# Patient Record
Sex: FEMALE | Race: OTHER | HISPANIC OR LATINO | URBAN - METROPOLITAN AREA
[De-identification: names, ages, dates, MRNs, and addresses within clinical notes are randomized per-mention and may not be internally consistent; named-entity substitution may affect disease eponyms.]

---

## 2017-05-23 ENCOUNTER — EMERGENCY (EMERGENCY)
Facility: HOSPITAL | Age: 52
LOS: 1 days | Discharge: HOME | End: 2017-05-23
Admitting: EMERGENCY MEDICINE
Payer: MEDICAID

## 2017-05-23 ENCOUNTER — APPOINTMENT (OUTPATIENT)
Dept: ORTHOPEDIC SURGERY | Facility: CLINIC | Age: 52
End: 2017-05-23

## 2017-05-23 VITALS
RESPIRATION RATE: 16 BRPM | HEART RATE: 92 BPM | DIASTOLIC BLOOD PRESSURE: 81 MMHG | SYSTOLIC BLOOD PRESSURE: 136 MMHG | TEMPERATURE: 98 F | OXYGEN SATURATION: 100 %

## 2017-05-23 PROCEDURE — 99053 MED SERV 10PM-8AM 24 HR FAC: CPT

## 2017-05-23 PROCEDURE — 99284 EMERGENCY DEPT VISIT MOD MDM: CPT | Mod: 25

## 2017-05-23 PROCEDURE — 73590 X-RAY EXAM OF LOWER LEG: CPT | Mod: 26,LT

## 2017-05-23 PROCEDURE — 73610 X-RAY EXAM OF ANKLE: CPT | Mod: 26,LT

## 2017-05-23 RX ORDER — KETOROLAC TROMETHAMINE 30 MG/ML
30 SYRINGE (ML) INJECTION ONCE
Qty: 0 | Refills: 0 | Status: DISCONTINUED | OUTPATIENT
Start: 2017-05-23 | End: 2017-05-23

## 2017-05-23 RX ORDER — IBUPROFEN 200 MG
1 TABLET ORAL
Qty: 15 | Refills: 0 | OUTPATIENT
Start: 2017-05-23 | End: 2017-05-28

## 2017-05-23 RX ADMIN — Medication 30 MILLIGRAM(S): at 03:27

## 2017-05-23 NOTE — ED PROVIDER NOTE - OBJECTIVE STATEMENT
52 y/o female no pmh c/o left ankle pain x2 days. Pt admits to being in [place] and having mechanical fall x2 days ago. pt went to doctor in [place] and dx with ankle fracture. Pt was placed in posterior splint and told to f/u. Pt presents today for eval of ankle and to make sure fracture has not become worse. pt admits to pain and swelling to left ankle. Denies numbness, tingling, weakness, redness, fever or chills.

## 2017-05-24 ENCOUNTER — APPOINTMENT (OUTPATIENT)
Dept: ORTHOPEDIC SURGERY | Facility: CLINIC | Age: 52
End: 2017-05-24

## 2017-05-24 VITALS
SYSTOLIC BLOOD PRESSURE: 108 MMHG | BODY MASS INDEX: 27.49 KG/M2 | HEART RATE: 85 BPM | WEIGHT: 165 LBS | DIASTOLIC BLOOD PRESSURE: 70 MMHG | HEIGHT: 65 IN

## 2017-05-24 DIAGNOSIS — M21.6X2 OTHER ACQUIRED DEFORMITIES OF LEFT FOOT: ICD-10-CM

## 2017-05-26 ENCOUNTER — MEDICATION RENEWAL (OUTPATIENT)
Age: 52
End: 2017-05-26

## 2017-05-27 PROBLEM — M21.6X2 GASTROCNEMIUS EQUINUS OF LEFT LOWER EXTREMITY: Status: ACTIVE | Noted: 2017-05-27

## 2017-06-07 ENCOUNTER — APPOINTMENT (OUTPATIENT)
Dept: ORTHOPEDIC SURGERY | Facility: CLINIC | Age: 52
End: 2017-06-07

## 2017-06-21 ENCOUNTER — APPOINTMENT (OUTPATIENT)
Dept: ORTHOPEDIC SURGERY | Facility: CLINIC | Age: 52
End: 2017-06-21

## 2017-07-06 ENCOUNTER — APPOINTMENT (OUTPATIENT)
Dept: ORTHOPEDIC SURGERY | Facility: CLINIC | Age: 52
End: 2017-07-06

## 2017-07-19 ENCOUNTER — APPOINTMENT (OUTPATIENT)
Dept: MAMMOGRAPHY | Facility: IMAGING CENTER | Age: 52
End: 2017-07-19

## 2017-07-19 ENCOUNTER — APPOINTMENT (OUTPATIENT)
Dept: RADIOLOGY | Facility: IMAGING CENTER | Age: 52
End: 2017-07-19

## 2017-07-19 ENCOUNTER — OUTPATIENT (OUTPATIENT)
Dept: OUTPATIENT SERVICES | Facility: HOSPITAL | Age: 52
LOS: 1 days | End: 2017-07-19
Payer: MEDICAID

## 2017-07-19 DIAGNOSIS — Z00.8 ENCOUNTER FOR OTHER GENERAL EXAMINATION: ICD-10-CM

## 2017-07-19 PROCEDURE — 77067 SCR MAMMO BI INCL CAD: CPT

## 2017-07-19 PROCEDURE — 77063 BREAST TOMOSYNTHESIS BI: CPT

## 2017-07-19 PROCEDURE — 71046 X-RAY EXAM CHEST 2 VIEWS: CPT

## 2017-07-25 ENCOUNTER — APPOINTMENT (OUTPATIENT)
Dept: ORTHOPEDIC SURGERY | Facility: CLINIC | Age: 52
End: 2017-07-25

## 2017-07-25 VITALS — BODY MASS INDEX: 27.49 KG/M2 | HEIGHT: 65 IN | WEIGHT: 165 LBS

## 2017-07-25 DIAGNOSIS — S82.892D OTHER FRACTURE OF LEFT LOWER LEG, SUBSEQUENT ENCOUNTER FOR CLOSED FRACTURE WITH ROUTINE HEALING: ICD-10-CM

## 2017-07-25 DIAGNOSIS — F17.200 NICOTINE DEPENDENCE, UNSPECIFIED, UNCOMPLICATED: ICD-10-CM

## 2017-07-25 DIAGNOSIS — Z12.31 ENCOUNTER FOR SCREENING MAMMOGRAM FOR MALIGNANT NEOPLASM OF BREAST: ICD-10-CM

## 2017-07-25 DIAGNOSIS — R91.8 OTHER NONSPECIFIC ABNORMAL FINDING OF LUNG FIELD: ICD-10-CM

## 2017-07-27 ENCOUNTER — APPOINTMENT (OUTPATIENT)
Dept: CT IMAGING | Facility: IMAGING CENTER | Age: 52
End: 2017-07-27
Payer: MEDICAID

## 2017-07-27 ENCOUNTER — OUTPATIENT (OUTPATIENT)
Dept: OUTPATIENT SERVICES | Facility: HOSPITAL | Age: 52
LOS: 1 days | End: 2017-07-27
Payer: MEDICAID

## 2017-07-27 DIAGNOSIS — Z00.8 ENCOUNTER FOR OTHER GENERAL EXAMINATION: ICD-10-CM

## 2017-07-27 PROCEDURE — 82565 ASSAY OF CREATININE: CPT

## 2017-07-27 PROCEDURE — 71260 CT THORAX DX C+: CPT | Mod: 26

## 2017-07-27 PROCEDURE — 71260 CT THORAX DX C+: CPT

## 2017-08-04 ENCOUNTER — EMERGENCY (EMERGENCY)
Facility: HOSPITAL | Age: 52
LOS: 1 days | Discharge: HOME | End: 2017-08-04
Attending: EMERGENCY MEDICINE | Admitting: EMERGENCY MEDICINE
Payer: MEDICAID

## 2017-08-04 VITALS
RESPIRATION RATE: 16 BRPM | DIASTOLIC BLOOD PRESSURE: 86 MMHG | TEMPERATURE: 99 F | SYSTOLIC BLOOD PRESSURE: 126 MMHG | HEART RATE: 72 BPM | OXYGEN SATURATION: 99 %

## 2017-08-04 PROCEDURE — 99285 EMERGENCY DEPT VISIT HI MDM: CPT | Mod: 25

## 2017-08-04 PROCEDURE — 93010 ELECTROCARDIOGRAM REPORT: CPT

## 2017-08-05 VITALS
RESPIRATION RATE: 15 BRPM | DIASTOLIC BLOOD PRESSURE: 82 MMHG | SYSTOLIC BLOOD PRESSURE: 128 MMHG | OXYGEN SATURATION: 100 % | HEART RATE: 54 BPM

## 2017-08-05 LAB
ALBUMIN SERPL ELPH-MCNC: 4 G/DL — SIGNIFICANT CHANGE UP (ref 3.3–5)
ALP SERPL-CCNC: 88 U/L — SIGNIFICANT CHANGE UP (ref 40–120)
ALT FLD-CCNC: 20 U/L — SIGNIFICANT CHANGE UP (ref 4–33)
AST SERPL-CCNC: 31 U/L — SIGNIFICANT CHANGE UP (ref 4–32)
BASOPHILS # BLD AUTO: 0.08 K/UL — SIGNIFICANT CHANGE UP (ref 0–0.2)
BASOPHILS NFR BLD AUTO: 0.7 % — SIGNIFICANT CHANGE UP (ref 0–2)
BASOPHILS NFR SPEC: 2 % — SIGNIFICANT CHANGE UP (ref 0–2)
BILIRUB SERPL-MCNC: 0.2 MG/DL — SIGNIFICANT CHANGE UP (ref 0.2–1.2)
BUN SERPL-MCNC: 22 MG/DL — SIGNIFICANT CHANGE UP (ref 7–23)
CALCIUM SERPL-MCNC: 10 MG/DL — SIGNIFICANT CHANGE UP (ref 8.4–10.5)
CHLORIDE SERPL-SCNC: 104 MMOL/L — SIGNIFICANT CHANGE UP (ref 98–107)
CK MB BLD-MCNC: 2.34 NG/ML — SIGNIFICANT CHANGE UP (ref 1–4.7)
CK SERPL-CCNC: 276 U/L — HIGH (ref 25–170)
CO2 SERPL-SCNC: 20 MMOL/L — LOW (ref 22–31)
CREAT SERPL-MCNC: 0.77 MG/DL — SIGNIFICANT CHANGE UP (ref 0.5–1.3)
EOSINOPHIL # BLD AUTO: 0.42 K/UL — SIGNIFICANT CHANGE UP (ref 0–0.5)
EOSINOPHIL NFR BLD AUTO: 3.5 % — SIGNIFICANT CHANGE UP (ref 0–6)
EOSINOPHIL NFR FLD: 5 % — SIGNIFICANT CHANGE UP (ref 0–6)
GLUCOSE SERPL-MCNC: 92 MG/DL — SIGNIFICANT CHANGE UP (ref 70–99)
HCT VFR BLD CALC: 39.1 % — SIGNIFICANT CHANGE UP (ref 34.5–45)
HGB BLD-MCNC: 12.6 G/DL — SIGNIFICANT CHANGE UP (ref 11.5–15.5)
IMM GRANULOCYTES # BLD AUTO: 0.02 # — SIGNIFICANT CHANGE UP
IMM GRANULOCYTES NFR BLD AUTO: 0.2 % — SIGNIFICANT CHANGE UP (ref 0–1.5)
LYMPHOCYTES # BLD AUTO: 47 % — HIGH (ref 13–44)
LYMPHOCYTES # BLD AUTO: 5.58 K/UL — HIGH (ref 1–3.3)
LYMPHOCYTES NFR SPEC AUTO: 44 % — SIGNIFICANT CHANGE UP (ref 13–44)
MANUAL SMEAR VERIFICATION: SIGNIFICANT CHANGE UP
MCHC RBC-ENTMCNC: 30.7 PG — SIGNIFICANT CHANGE UP (ref 27–34)
MCHC RBC-ENTMCNC: 32.2 % — SIGNIFICANT CHANGE UP (ref 32–36)
MCV RBC AUTO: 95.1 FL — SIGNIFICANT CHANGE UP (ref 80–100)
MONOCYTES # BLD AUTO: 0.84 K/UL — SIGNIFICANT CHANGE UP (ref 0–0.9)
MONOCYTES NFR BLD AUTO: 7.1 % — SIGNIFICANT CHANGE UP (ref 2–14)
MONOCYTES NFR BLD: 3 % — SIGNIFICANT CHANGE UP (ref 2–9)
MORPHOLOGY BLD-IMP: NORMAL — SIGNIFICANT CHANGE UP
NEUTROPHIL AB SER-ACNC: 46 % — SIGNIFICANT CHANGE UP (ref 43–77)
NEUTROPHILS # BLD AUTO: 4.94 K/UL — SIGNIFICANT CHANGE UP (ref 1.8–7.4)
NEUTROPHILS NFR BLD AUTO: 41.5 % — LOW (ref 43–77)
NRBC # FLD: 0 — SIGNIFICANT CHANGE UP
PLATELET # BLD AUTO: 344 K/UL — SIGNIFICANT CHANGE UP (ref 150–400)
PLATELET COUNT - ESTIMATE: NORMAL — SIGNIFICANT CHANGE UP
PMV BLD: 10 FL — SIGNIFICANT CHANGE UP (ref 7–13)
POTASSIUM SERPL-MCNC: 5.5 MMOL/L — HIGH (ref 3.5–5.3)
POTASSIUM SERPL-SCNC: 5.5 MMOL/L — HIGH (ref 3.5–5.3)
PROT SERPL-MCNC: 7.4 G/DL — SIGNIFICANT CHANGE UP (ref 6–8.3)
RBC # BLD: 4.11 M/UL — SIGNIFICANT CHANGE UP (ref 3.8–5.2)
RBC # FLD: 14 % — SIGNIFICANT CHANGE UP (ref 10.3–14.5)
SODIUM SERPL-SCNC: 139 MMOL/L — SIGNIFICANT CHANGE UP (ref 135–145)
TROPONIN T SERPL-MCNC: < 0.06 NG/ML — SIGNIFICANT CHANGE UP (ref 0–0.06)
WBC # BLD: 11.88 K/UL — HIGH (ref 3.8–10.5)
WBC # FLD AUTO: 11.88 K/UL — HIGH (ref 3.8–10.5)

## 2017-08-05 RX ORDER — METOCLOPRAMIDE HCL 10 MG
10 TABLET ORAL ONCE
Qty: 0 | Refills: 0 | Status: COMPLETED | OUTPATIENT
Start: 2017-08-05 | End: 2017-08-05

## 2017-08-05 RX ORDER — ACETAMINOPHEN 500 MG
650 TABLET ORAL ONCE
Qty: 0 | Refills: 0 | Status: DISCONTINUED | OUTPATIENT
Start: 2017-08-05 | End: 2017-08-08

## 2017-08-05 RX ORDER — SODIUM CHLORIDE 9 MG/ML
1000 INJECTION INTRAMUSCULAR; INTRAVENOUS; SUBCUTANEOUS ONCE
Qty: 0 | Refills: 0 | Status: COMPLETED | OUTPATIENT
Start: 2017-08-05 | End: 2017-08-05

## 2017-08-05 NOTE — ED PROVIDER NOTE - OBJECTIVE STATEMENT
51-year-old female with multiple complaints. Her biggest concern is headache she has had for 12 hours since waking up this morning. Describes as pressure-like, left-sided, associated w left eye pain, without nausea or vomiting. Has had headaches before but this feels different/worse than usual, no history migraines. Tried Aleve with little relief. Denies dizziness or blurry vision. Also complains of diffuse, bilateral neck pain she has had for 2 months that worsened today, as well as non-exertional left-sided chest pain without radiation/nausea and without exertional association. Has SOB at baseline (has been smoking 35+ years) but denies any worsening today. Complains of bilateral leg swelling as well; no recent travel, no pleurisy, no fevers or abdominal pain.

## 2017-08-05 NOTE — ED PROVIDER NOTE - PLAN OF CARE
You were seen in the emergency department for headache and chest pain. Your headache resolved after reglan, and was thought to be due to a migraine. Your chest pain resolved; you had a normal electrocardiogram and no concerning features. If your headache or chest pain worsens, you develop fevers or worsening shortness of breath, or you have vomiting or any other concerning symptoms, please return to the ED. Please follow up with your primary doctor in the next week. Take tylenol as needed for headache and any other pain.

## 2017-08-05 NOTE — ED PROVIDER NOTE - CARE PLAN
Principal Discharge DX:	Migraine without aura and without status migrainosus, not intractable  Instructions for follow-up, activity and diet:	You were seen in the emergency department for headache and chest pain. Your headache resolved after reglan, and was thought to be due to a migraine. Your chest pain resolved; you had a normal electrocardiogram and no concerning features. If your headache or chest pain worsens, you develop fevers or worsening shortness of breath, or you have vomiting or any other concerning symptoms, please return to the ED. Please follow up with your primary doctor in the next week. Take tylenol as needed for headache and any other pain.

## 2017-08-05 NOTE — ED ADULT NURSE NOTE - OBJECTIVE STATEMENT
Nany RN: Received pt in room 5, ambulatory, pt A&Ox4, respirations even and unlabored b/l. Sinus shanta on cardiac monitor. Swelling noted on ankles b/l. Labs sent. Awaiting MD santos. Report endorsed to primary RN Racquel.

## 2017-08-05 NOTE — ED ADULT NURSE REASSESSMENT NOTE - NS ED NURSE REASSESS COMMENT FT1
No acute distress. Pt. appears comfortable at present. Pt. is being discharged. Discharge teaching done by MD Goldberger.
Pt. refused Tylenol. Pt. states "I just want to be discharged and go home." MD Goldberger made aware.
Report received from break coverage RACHNA Borrego. Pt. states "I want to go home." MD aware.
pt. alert, awake, reports improvement in headache (now 6/10) since earlier. Attempted IV placement x 3; pt. pulled arm away, stating, "this hurts more than my headache, I don't want IV anymore, can you just give me a pill or something?". MD notified and aware. VS as noted, in NAD.

## 2017-08-05 NOTE — ED PROVIDER NOTE - ATTENDING CONTRIBUTION TO CARE
Case of a 50 y/o female patient with signs and symptoms of migraine, will give pain meds, order labs and monitor closely. Agree with resident's physical exam, assessment and documentation

## 2017-08-05 NOTE — ED PROVIDER NOTE - PROGRESS NOTE DETAILS
patient states that she is no longer any pain and would like to go home. Rejects pain medications offered.

## 2017-08-05 NOTE — ED PROVIDER NOTE - MEDICAL DECISION MAKING DETAILS
52yo female here for headache, neck pain, nonspecific chest pain. Normal EKG. Suspicion for PE low given absence of risk factors, no tachycardia or tachypnea. Headache relieved by reglan, no need for CT head at this time since seems like migraine clinically.

## 2017-08-05 NOTE — ED ADULT NURSE NOTE - CHIEF COMPLAINT
The patient is a 51y Female complaining of left sided h/a since morning, left-sided c/p since afternoon rad to neck. Pt c/o b/l LE swelling, reports healing left broken ankle x2 months, noticed right ankle swelling yesterday and swelling b/l knees.

## 2017-08-05 NOTE — ED ADULT NURSE NOTE - CHIEF COMPLAINT QUOTE
MRN:[id number]                      After Visit Summary   4/6/2017    Jayce Myers    MRN: [id number]           Thank you!     Thank you for choosing [facility] for your care. Our goal is always to provide you with excellent care. Hearing back from our patients is one way we can continue to improve our services. Please take a few minutes to complete the written survey that you may receive in the mail after you visit with us. Thank you!        Patient Information     Date Of Birth          1967        Designated Caregiver       Most Recent Value    Caregiver    Will someone help with your care after discharge? no      About your hospital stay     You were admitted on:  April 6, 2017 You last received care in the:  Unit 6D Observation [facility]    You were discharged on:  April 7, 2017        Reason for your hospital stay       Mr. Myers, you were evaluated for chest pain.  Your heart muscle blood work was negative. Your vital signs remained stable.  Your electrolytes, hemoglobin, white blood cell count were all within normal limits.  Your lipid (cholesterol) lab work was borderline high. Follow up with your primary care provider in 1 week.  You have been given Tramadol for your chest wall pain. Take as directed. Do not drive, drink alcohol or operate machinery while taking this medication. It can make you drowsy.  As discussed, modify your diet- increase fresh fruits and vegetables, avoid high fat, processed foods. Increase your water intake. Avoid caffeine. Increase your exercise regimen as tolerated.  Warm packs four times daily to affected area (15 minutes at a time) while awake.  Return to the emergency department for worsening chest pain, shortness of breath, worsening symptoms or concerns.                  Who to Call     For medical emergencies, please call 911.  For non-urgent questions about your medical care, please call your primary care provider or clinic, [phone]           Attending Provider     Provider Specialty    Kayla Diaz MD Emergency Medicine    Axel Camarena MD Emergency Medicine       Primary Care Provider Office Phone # Fax #    Chapo Maldonado [phone]       [facility] [address] [facility] [address]  [facility] [address]         When to contact your care team       Return to the emergency department for chest pain, shortness of breath, fever, worsening symptoms or concerns                  After Care Instructions     Activity       Your activity upon discharge: activity as tolerated            Diet       Follow this diet upon discharge: Orders Placed This Encounter      Low Saturated Fat Na <2400 mg                  Follow-up Appointments     Adult [facility] Follow-up and recommended labs and tests       Follow up with primary care provider, Chapo Maldonado, within 7 days for hospital follow- up.  Follow up on lipid panel.    Appointments on [place] and/or [facility] (with [facility] or [facility] provider or service). Call [phone] if you haven't heard regarding these appointments within 7 days of discharge.                  Your next 10 appointments already scheduled     Apr 17, 2017  3:30 PM CDT   Office Visit with Chapo Maldonado MD   [facility] ([facility])    [address] [place] [address] [address]  [facility] [id number] [phone]           Bring a current list of meds and any records pertaining to this visit.  For Physicals, please bring immunization records and any forms needing to be filled out.  Please arrive 10 minutes early to complete paperwork.              Pending Results     No orders found for last 3 day(s).            Statement of Approval     Ordered          04/07/17 1418  I have reviewed and agree with all the recommendations and orders detailed in this document.  EFFECTIVE NOW     Approved and electronically signed by:  Meka Perdomo APRN CNP         Pt c/o L sided CP & HA x 1d. Also c/o neck pain x 2 mo. Also c/o BLE swelling x 3d.      Admission Information     Date & Time Provider Department Dept. Phone    4/6/2017 Axel Camarena MD Unit 6D Observation [facility] [place] [phone]      Your Vitals Were     Blood Pressure Pulse Temperature Respirations Weight Pulse Oximetry    103/73 67 98.2  F (36.8  C) (Oral) 18 73.9 kg (163 lb) 98%    BMI (Body Mass Index)                   25.53 kg/m2           [employer] Information     [employer] gives you secure access to your electronic health record. If you see a primary care provider, you can also send messages to your care team and make appointments. If you have questions, please call your primary care clinic.  If you do not have a primary care provider, please call [phone] and they will assist you.        Care EveryWhere ID     This is your Care EveryWhere ID. This could be used by other organizations to access your [facility] medical records  [id number]           Review of your medicines      START taking        Dose / Directions    traMADol 50 MG tablet   Commonly known as:  ULTRAM   Used for:  Costochondral chest pain        Dose:  50 mg   Take 1 tablet (50 mg) by mouth every 6 hours as needed for breakthrough pain maximum 3 tablet(s) per day   Quantity:  12 tablet   Refills:  0         CONTINUE these medicines which have NOT CHANGED        Dose / Directions    IBUPROFEN PO        Dose:  400 mg   Take 400 mg by mouth every 4 hours as needed for moderate pain   Refills:  0       loratadine 10 MG tablet   Commonly known as:  CLARITIN        Dose:  10 mg   Take 10 mg by mouth daily   Refills:  0            Where to get your medicines      Some of these will need a paper prescription and others can be bought over the counter. Ask your nurse if you have questions.     Bring a paper prescription for each of these medications     traMADol 50 MG tablet                Protect others around you: Learn how to safely use, store and throw away your medicines at www.disposemymeds.org.              Medication List: This is a list of all your medications and when to take them. Check marks below indicate your daily home schedule. Keep this list as a reference.      Medications           Morning Afternoon Evening Bedtime As Needed    IBUPROFEN PO   Take 400 mg by mouth every 4 hours as needed for moderate pain   Last time this was given:  400 mg on 4/6/2017 10:04 PM                                loratadine 10 MG tablet   Commonly known as:  CLARITIN   Take 10 mg by mouth daily                                traMADol 50 MG tablet   Commonly known as:  ULTRAM   Take 1 tablet (50 mg) by mouth every 6 hours as needed for breakthrough pain maximum 3 tablet(s) per day                                          More Information         *CHEST PAIN, UNCERTAIN CAUSE    Based on your exam today, the exact cause of your chest pain is not certain. Your condition does not seem serious at this time, and your pain does not appear to be coming from your heart. However, sometimes the signs of a serious problem take more time to appear. Therefore, watch for the warning signs listed below.  HOME CARE:  1. Rest today and avoid strenuous activity.  2. Take any prescribed medicine as directed.  FOLLOW UP with your doctor in 1-3 days.   GET PROMPT MEDICAL ATTENTION if any of the following occur:    A change in the type of pain: if it feels different, becomes more severe, lasts longer, or begins to spread into your shoulder, arm, neck, jaw or back    Shortness of breath or increased pain with breathing    Weakness, dizziness, or fainting    Cough with blood or dark colored sputum (phlegm)    Fever over 101  F (38.3  C)    Swelling, pain or redness in one leg    [id number] [address], [place] [address]. All rights reserved. This information is not intended as a substitute for professional medical care. Always follow your healthcare professional's instructions.

## 2017-08-14 ENCOUNTER — OUTPATIENT (OUTPATIENT)
Dept: OUTPATIENT SERVICES | Facility: HOSPITAL | Age: 52
LOS: 1 days | End: 2017-08-14
Payer: MEDICAID

## 2017-08-14 ENCOUNTER — APPOINTMENT (OUTPATIENT)
Dept: MRI IMAGING | Facility: IMAGING CENTER | Age: 52
End: 2017-08-14
Payer: MEDICAID

## 2017-08-14 DIAGNOSIS — Z00.8 ENCOUNTER FOR OTHER GENERAL EXAMINATION: ICD-10-CM

## 2017-08-14 PROCEDURE — 74183 MRI ABD W/O CNTR FLWD CNTR: CPT

## 2017-08-14 PROCEDURE — 74183 MRI ABD W/O CNTR FLWD CNTR: CPT | Mod: 26

## 2017-08-14 PROCEDURE — A9585: CPT

## 2017-08-16 ENCOUNTER — OUTPATIENT (OUTPATIENT)
Dept: OUTPATIENT SERVICES | Facility: HOSPITAL | Age: 52
LOS: 1 days | End: 2017-08-16
Payer: MEDICAID

## 2017-08-16 ENCOUNTER — APPOINTMENT (OUTPATIENT)
Dept: RADIOLOGY | Facility: IMAGING CENTER | Age: 52
End: 2017-08-16
Payer: MEDICAID

## 2017-08-16 DIAGNOSIS — Z00.8 ENCOUNTER FOR OTHER GENERAL EXAMINATION: ICD-10-CM

## 2017-08-16 PROCEDURE — 72050 X-RAY EXAM NECK SPINE 4/5VWS: CPT

## 2017-08-16 PROCEDURE — 72050 X-RAY EXAM NECK SPINE 4/5VWS: CPT | Mod: 26

## 2017-08-29 ENCOUNTER — APPOINTMENT (OUTPATIENT)
Dept: ORTHOPEDIC SURGERY | Facility: CLINIC | Age: 52
End: 2017-08-29

## 2017-09-05 ENCOUNTER — APPOINTMENT (OUTPATIENT)
Dept: ORTHOPEDIC SURGERY | Facility: CLINIC | Age: 52
End: 2017-09-05

## 2017-10-16 ENCOUNTER — APPOINTMENT (OUTPATIENT)
Dept: OTOLARYNGOLOGY | Facility: CLINIC | Age: 52
End: 2017-10-16

## 2018-02-01 ENCOUNTER — OUTPATIENT (OUTPATIENT)
Dept: OUTPATIENT SERVICES | Facility: HOSPITAL | Age: 53
LOS: 1 days | End: 2018-02-01
Payer: MEDICAID

## 2018-02-01 PROCEDURE — G9001: CPT

## 2018-02-02 ENCOUNTER — LABORATORY RESULT (OUTPATIENT)
Age: 53
End: 2018-02-02

## 2018-02-02 ENCOUNTER — MOBILE ON CALL (OUTPATIENT)
Age: 53
End: 2018-02-02

## 2018-02-02 ENCOUNTER — APPOINTMENT (OUTPATIENT)
Dept: DERMATOLOGY | Facility: CLINIC | Age: 53
End: 2018-02-02
Payer: MEDICAID

## 2018-02-02 VITALS
SYSTOLIC BLOOD PRESSURE: 122 MMHG | HEIGHT: 65 IN | BODY MASS INDEX: 26.33 KG/M2 | WEIGHT: 158 LBS | DIASTOLIC BLOOD PRESSURE: 84 MMHG

## 2018-02-02 DIAGNOSIS — Z78.9 OTHER SPECIFIED HEALTH STATUS: ICD-10-CM

## 2018-02-02 DIAGNOSIS — R23.8 OTHER SKIN CHANGES: ICD-10-CM

## 2018-02-02 DIAGNOSIS — F17.200 NICOTINE DEPENDENCE, UNSPECIFIED, UNCOMPLICATED: ICD-10-CM

## 2018-02-02 PROCEDURE — 11100 BX SKIN SUBCUTANEOUS&/MUCOUS MEMBRANE 1 LESION: CPT | Mod: GC

## 2018-02-02 PROCEDURE — 99203 OFFICE O/P NEW LOW 30 MIN: CPT | Mod: 25,GC

## 2018-02-02 RX ORDER — NICOTINE TRANSDERMAL SYSTEM 21 MG/24H
21 PATCH, EXTENDED RELEASE TRANSDERMAL
Qty: 14 | Refills: 0 | Status: ACTIVE | COMMUNITY
Start: 2018-01-19

## 2018-02-06 ENCOUNTER — APPOINTMENT (OUTPATIENT)
Dept: OBGYN | Facility: CLINIC | Age: 53
End: 2018-02-06

## 2018-02-07 ENCOUNTER — RESULT REVIEW (OUTPATIENT)
Age: 53
End: 2018-02-07

## 2018-02-08 ENCOUNTER — EMERGENCY (EMERGENCY)
Facility: HOSPITAL | Age: 53
LOS: 1 days | Discharge: HOME | End: 2018-02-08
Attending: EMERGENCY MEDICINE | Admitting: EMERGENCY MEDICINE
Payer: MEDICAID

## 2018-02-08 VITALS
OXYGEN SATURATION: 100 % | HEART RATE: 99 BPM | SYSTOLIC BLOOD PRESSURE: 110 MMHG | RESPIRATION RATE: 18 BRPM | TEMPERATURE: 100 F | DIASTOLIC BLOOD PRESSURE: 68 MMHG

## 2018-02-08 PROCEDURE — 99283 EMERGENCY DEPT VISIT LOW MDM: CPT | Mod: 25

## 2018-02-08 NOTE — ED ADULT TRIAGE NOTE - CHIEF COMPLAINT QUOTE
Patient c/o abscess to right buttocks, left chin, and by left of bridge of nose. Patient reports having biopsy to abscess on face and was told it was "cancerous". Patient c/o "pink, thick" discharge from buttocks. Patient denies any fevers, c/o chills. Denies any other PMHx.

## 2018-02-08 NOTE — ED PROVIDER NOTE - PROGRESS NOTE DETAILS
Dr. Brandon Michaels PGY1: US showing cobblestoning in cellulitic area, very small fluid pocket 3cm below skin, will not D&C, will dc w/ keflex

## 2018-02-08 NOTE — ED PROVIDER NOTE - MEDICAL DECISION MAKING DETAILS
51 yo F w/ likely uncomplicated R buttock cellulitis, no constitutional sx, will US r/o abscess, I&D if present, otherwise likely dc w/ po ab

## 2018-02-08 NOTE — ED PROVIDER NOTE - ATTENDING CONTRIBUTION TO CARE
DR. RIVER, ATTENDING MD-  I performed a face to face bedside interview with patient regarding history of present illness, review of symptoms and past medical history. I completed an independent physical exam.  I have discussed patient's plan of care with the resident.   Documentation as above in the note.   HPI: 51 yo F with no Past Medical History that presents with left buttocks pain x 2 wks worsening, hard to sit, noticed small pimple 2 wks ago, she "pinched" the pimple and worsened, but had some small amount bleeding. Since has not taken meds for this. Saw Derm for other issues and had biopsy of nose and turned out to be cancer, has f/u next week. No systemic symptoms including Fevers, chills, N/V/D, dysuria, hematuria.   EXAM: Female Chaperone (Maddie NAVA), left buttocks with 10 x 10cm erythema, tenderness to palpation and induration, no palpable fluctuance, no crepitus, no signs nec fasc. No bleeding or drainage. Bedside US shows no subcu abscess seen.   MDM: Concern is for cellulitis. No pus pocket on US. Pt reports already had two episodes drainage trying to drain pimple at home. Will send home rec pain meds OTC, sitz bath, and Rx for PO Abx. f/u with PMD, Return precautions explained such as worsening symptoms/pain, drainage, fevers, chills, N/V/D. Rec to stop trying to drain abscess in future.

## 2018-02-08 NOTE — ED ADULT NURSE NOTE - OBJECTIVE STATEMENT
Nany RN-Pt received to rm 7 a&ox4 and ambulatory c/o abscess to left buttock. Pt states abscess started about 2 weeks ago and has gotten worse. Area noted to be swollen and red. Pt also had skin biopsy on bridge of nose done last week and was told it was cancerous. MD at bedside, will continue to monitor

## 2018-02-08 NOTE — ED PROVIDER NOTE - PHYSICAL EXAMINATION
*GEN:   comfortable, in no acute distress, AOx3    ///    *EYES:   pupils equally round and reactive to light, extra-occular movements intact    ///    *HEENT:   airway patent, moist mucosal membranes    ///    *CV:   regular rate and rhythm    ///    *RESP:   clear to auscultation bilaterally, non-labored    ///    *ABD:   soft, non-tender    ///    *:   no cva/flank tenderness    ///    *MSK:   no MSK tenderness or limited ROM    ///    *SKIN:   R buttock 4x4cm tender erythematous maculopapular patch, 2x2cm area increased fluctuance, not involving perineum; L nose non-tender 0.5x0.5cm ulcer    ///    *NEURO:   AOx3, no focal weakness or loss of sensation, gait normal

## 2018-02-08 NOTE — ED PROVIDER NOTE - OBJECTIVE STATEMENT
53 yo F w/out pmh p/w R buttock abscess x 1.5 wks. Pt reports started as a pimple and has expanded, came to ED today bc of further expansion/pain. Reports had L chin abscess 3 wks ago that spontaneous drained and is now asymptomatic. Pt has been seeing dermatologist for L nose lesion, bx found to be positive for cancer yesterday. Denies constitutional sx, including fever, sob, nausea/vomit, chest / abd pain, dysuria/hematuria, diarrhea/constipation. Reports L breast erythematous itchy patch x 2 days.    Derm: Estrellita Willson  PCP: Valerie Franklin

## 2018-02-09 VITALS — HEART RATE: 79 BPM

## 2018-02-09 RX ORDER — CEPHALEXIN 500 MG
1 CAPSULE ORAL
Qty: 20 | Refills: 0 | OUTPATIENT
Start: 2018-02-09 | End: 2018-02-18

## 2018-02-11 ENCOUNTER — INPATIENT (INPATIENT)
Facility: HOSPITAL | Age: 53
LOS: 1 days | Discharge: HOME HEALTH | End: 2018-02-13
Attending: SURGERY | Admitting: SURGERY
Payer: MEDICAID

## 2018-02-11 VITALS
SYSTOLIC BLOOD PRESSURE: 120 MMHG | TEMPERATURE: 99 F | DIASTOLIC BLOOD PRESSURE: 78 MMHG | HEART RATE: 91 BPM | RESPIRATION RATE: 19 BRPM | OXYGEN SATURATION: 97 %

## 2018-02-11 PROCEDURE — 72170 X-RAY EXAM OF PELVIS: CPT | Mod: 26

## 2018-02-11 RX ORDER — VANCOMYCIN HCL 1 G
1000 VIAL (EA) INTRAVENOUS ONCE
Qty: 0 | Refills: 0 | Status: COMPLETED | OUTPATIENT
Start: 2018-02-11 | End: 2018-02-11

## 2018-02-11 RX ORDER — MORPHINE SULFATE 50 MG/1
4 CAPSULE, EXTENDED RELEASE ORAL ONCE
Qty: 0 | Refills: 0 | Status: DISCONTINUED | OUTPATIENT
Start: 2018-02-11 | End: 2018-02-11

## 2018-02-11 RX ADMIN — Medication 250 MILLIGRAM(S): at 23:51

## 2018-02-11 RX ADMIN — MORPHINE SULFATE 4 MILLIGRAM(S): 50 CAPSULE, EXTENDED RELEASE ORAL at 23:51

## 2018-02-11 NOTE — ED PROVIDER NOTE - PROGRESS NOTE DETAILS
Sign out follow-up: No abscess on CT. Surgery evaluated pt in ed and cleared for medical admission for cellulitis. Transient low BP improved with 2L bolus. THEODORA

## 2018-02-11 NOTE — ED ADULT NURSE NOTE - OBJECTIVE STATEMENT
Patient received in room #26 c/o worsening cellulitis to right buttocks. Patient A&Ox3, ambulatory. Patient was seen in ED and D/C in different abx, patient reprots being compliant with medication but denies any relief. Patient c/o worsening pain and increase in puss noted. Patient denies any fevers or chills. VS as noted. Will monitor.

## 2018-02-11 NOTE — ED PROVIDER NOTE - ATTENDING CONTRIBUTION TO CARE
Attending Statement: I have personally seen and examined this patient. I have fully participated in the care of this patient. I have reviewed all pertinent clinical information, including history physical exam, plan and the Resident's note and agree except as noted  53yo F no sig pmhx pw pain and swelling to the right buttocks. pt states it started out "like a pimple on the right buttocks" came to  ED 3 days ago placed on keflex and discharged. Has been on two days of keflex wo improvement. Feels that pain and swelling has worsened. no fever. no vomit.   Vital signs noted. pt uncomfortable, tearful. examined with Dr Wise: right buttocks 16cm by 4cm wide area of erythema, warm and fluctuance. not involving the rectum.   plan pain med, IV abx, labs, ct pelvis w IV contrast and admit

## 2018-02-11 NOTE — ED ADULT TRIAGE NOTE - CHIEF COMPLAINT QUOTE
Pt c/o worsening cellulitis. Was seen here Thursday, dx with Cellulitis and has been taking Keflex and Ibuprofen with no relief. States worsening pus and pain from site, appears uncomfortable and tearful in triage.

## 2018-02-11 NOTE — ED PROVIDER NOTE - OBJECTIVE STATEMENT
52F presenting with worsening of rt. butt cheek abscess. Pt came 3d ago for a small pimple on her rt. buttock, d/c home with Keflex. Pt now came back for quickly enlarging of the abscess with purulent discharge and increased pain. Pt denies needle use, no sig medical problems, no prior instrumentation. Now the size of the abscess is 16x6cm.

## 2018-02-11 NOTE — ED PROVIDER NOTE - MEDICAL DECISION MAKING DETAILS
52F presenting with worsening of Rt. butt abscess now measuring 16x6cm, with purulent discharge and drainage. Will get preOP labs, CT and pain control, Vanc.

## 2018-02-12 DIAGNOSIS — C43.30 MALIGNANT MELANOMA OF UNSPECIFIED PART OF FACE: ICD-10-CM

## 2018-02-12 DIAGNOSIS — Z29.9 ENCOUNTER FOR PROPHYLACTIC MEASURES, UNSPECIFIED: ICD-10-CM

## 2018-02-12 DIAGNOSIS — L03.317 CELLULITIS OF BUTTOCK: ICD-10-CM

## 2018-02-12 DIAGNOSIS — R63.8 OTHER SYMPTOMS AND SIGNS CONCERNING FOOD AND FLUID INTAKE: ICD-10-CM

## 2018-02-12 DIAGNOSIS — L03.90 CELLULITIS, UNSPECIFIED: ICD-10-CM

## 2018-02-12 LAB
ALBUMIN SERPL ELPH-MCNC: 3.9 G/DL — SIGNIFICANT CHANGE UP (ref 3.3–5)
ALP SERPL-CCNC: 92 U/L — SIGNIFICANT CHANGE UP (ref 40–120)
ALT FLD-CCNC: 17 U/L — SIGNIFICANT CHANGE UP (ref 4–33)
APTT BLD: 31 SEC — SIGNIFICANT CHANGE UP (ref 27.5–37.4)
AST SERPL-CCNC: 14 U/L — SIGNIFICANT CHANGE UP (ref 4–32)
BASE EXCESS BLDV CALC-SCNC: 0.3 MMOL/L — SIGNIFICANT CHANGE UP
BASOPHILS # BLD AUTO: 0.08 K/UL — SIGNIFICANT CHANGE UP (ref 0–0.2)
BASOPHILS NFR BLD AUTO: 0.6 % — SIGNIFICANT CHANGE UP (ref 0–2)
BILIRUB SERPL-MCNC: < 0.2 MG/DL — LOW (ref 0.2–1.2)
BLD GP AB SCN SERPL QL: NEGATIVE — SIGNIFICANT CHANGE UP
BLOOD GAS VENOUS - CREATININE: 0.54 MG/DL — SIGNIFICANT CHANGE UP (ref 0.5–1.3)
BUN SERPL-MCNC: 19 MG/DL — SIGNIFICANT CHANGE UP (ref 7–23)
CALCIUM SERPL-MCNC: 10.1 MG/DL — SIGNIFICANT CHANGE UP (ref 8.4–10.5)
CHLORIDE BLDV-SCNC: 110 MMOL/L — HIGH (ref 96–108)
CHLORIDE SERPL-SCNC: 104 MMOL/L — SIGNIFICANT CHANGE UP (ref 98–107)
CO2 SERPL-SCNC: 22 MMOL/L — SIGNIFICANT CHANGE UP (ref 22–31)
CREAT SERPL-MCNC: 0.6 MG/DL — SIGNIFICANT CHANGE UP (ref 0.5–1.3)
EOSINOPHIL # BLD AUTO: 0.22 K/UL — SIGNIFICANT CHANGE UP (ref 0–0.5)
EOSINOPHIL NFR BLD AUTO: 1.6 % — SIGNIFICANT CHANGE UP (ref 0–6)
GAS PNL BLDV: 139 MMOL/L — SIGNIFICANT CHANGE UP (ref 136–146)
GLUCOSE BLDV-MCNC: 94 — SIGNIFICANT CHANGE UP (ref 70–99)
GLUCOSE SERPL-MCNC: 85 MG/DL — SIGNIFICANT CHANGE UP (ref 70–99)
HCO3 BLDV-SCNC: 23 MMOL/L — SIGNIFICANT CHANGE UP (ref 20–27)
HCT VFR BLD CALC: 42.9 % — SIGNIFICANT CHANGE UP (ref 34.5–45)
HCT VFR BLDV CALC: 42.4 % — SIGNIFICANT CHANGE UP (ref 34.5–45)
HGB BLD-MCNC: 13.4 G/DL — SIGNIFICANT CHANGE UP (ref 11.5–15.5)
HGB BLDV-MCNC: 13.8 G/DL — SIGNIFICANT CHANGE UP (ref 11.5–15.5)
IMM GRANULOCYTES # BLD AUTO: 0.12 # — SIGNIFICANT CHANGE UP
IMM GRANULOCYTES NFR BLD AUTO: 0.8 % — SIGNIFICANT CHANGE UP (ref 0–1.5)
INR BLD: 1.22 — HIGH (ref 0.88–1.17)
LACTATE BLDV-MCNC: 1.2 MMOL/L — SIGNIFICANT CHANGE UP (ref 0.5–2)
LYMPHOCYTES # BLD AUTO: 22.8 % — SIGNIFICANT CHANGE UP (ref 13–44)
LYMPHOCYTES # BLD AUTO: 3.23 K/UL — SIGNIFICANT CHANGE UP (ref 1–3.3)
MCHC RBC-ENTMCNC: 29.1 PG — SIGNIFICANT CHANGE UP (ref 27–34)
MCHC RBC-ENTMCNC: 31.2 % — LOW (ref 32–36)
MCV RBC AUTO: 93.3 FL — SIGNIFICANT CHANGE UP (ref 80–100)
MONOCYTES # BLD AUTO: 1.27 K/UL — HIGH (ref 0–0.9)
MONOCYTES NFR BLD AUTO: 9 % — SIGNIFICANT CHANGE UP (ref 2–14)
NEUTROPHILS # BLD AUTO: 9.22 K/UL — HIGH (ref 1.8–7.4)
NEUTROPHILS NFR BLD AUTO: 65.2 % — SIGNIFICANT CHANGE UP (ref 43–77)
NRBC # FLD: 0 — SIGNIFICANT CHANGE UP
PCO2 BLDV: 56 MMHG — HIGH (ref 41–51)
PH BLDV: 7.29 PH — LOW (ref 7.32–7.43)
PLATELET # BLD AUTO: 525 K/UL — HIGH (ref 150–400)
PMV BLD: 9.2 FL — SIGNIFICANT CHANGE UP (ref 7–13)
PO2 BLDV: 39 MMHG — SIGNIFICANT CHANGE UP (ref 35–40)
POTASSIUM BLDV-SCNC: 3.7 MMOL/L — SIGNIFICANT CHANGE UP (ref 3.4–4.5)
POTASSIUM SERPL-MCNC: 4 MMOL/L — SIGNIFICANT CHANGE UP (ref 3.5–5.3)
POTASSIUM SERPL-SCNC: 4 MMOL/L — SIGNIFICANT CHANGE UP (ref 3.5–5.3)
PROT SERPL-MCNC: 8.1 G/DL — SIGNIFICANT CHANGE UP (ref 6–8.3)
PROTHROM AB SERPL-ACNC: 14.1 SEC — HIGH (ref 9.8–13.1)
RBC # BLD: 4.6 M/UL — SIGNIFICANT CHANGE UP (ref 3.8–5.2)
RBC # FLD: 13.2 % — SIGNIFICANT CHANGE UP (ref 10.3–14.5)
RH IG SCN BLD-IMP: POSITIVE — SIGNIFICANT CHANGE UP
RH IG SCN BLD-IMP: POSITIVE — SIGNIFICANT CHANGE UP
SAO2 % BLDV: 66.4 % — SIGNIFICANT CHANGE UP (ref 60–85)
SODIUM SERPL-SCNC: 142 MMOL/L — SIGNIFICANT CHANGE UP (ref 135–145)
WBC # BLD: 14.14 K/UL — HIGH (ref 3.8–10.5)
WBC # FLD AUTO: 14.14 K/UL — HIGH (ref 3.8–10.5)

## 2018-02-12 PROCEDURE — 99223 1ST HOSP IP/OBS HIGH 75: CPT | Mod: GC

## 2018-02-12 PROCEDURE — 99231 SBSQ HOSP IP/OBS SF/LOW 25: CPT | Mod: 57,GC

## 2018-02-12 PROCEDURE — 12345: CPT | Mod: NC

## 2018-02-12 PROCEDURE — 72193 CT PELVIS W/DYE: CPT | Mod: 26

## 2018-02-12 RX ORDER — MORPHINE SULFATE 50 MG/1
4 CAPSULE, EXTENDED RELEASE ORAL EVERY 4 HOURS
Qty: 0 | Refills: 0 | Status: DISCONTINUED | OUTPATIENT
Start: 2018-02-12 | End: 2018-02-13

## 2018-02-12 RX ORDER — ACETAMINOPHEN 500 MG
650 TABLET ORAL EVERY 6 HOURS
Qty: 0 | Refills: 0 | Status: DISCONTINUED | OUTPATIENT
Start: 2018-02-12 | End: 2018-02-12

## 2018-02-12 RX ORDER — ENOXAPARIN SODIUM 100 MG/ML
40 INJECTION SUBCUTANEOUS EVERY 24 HOURS
Qty: 0 | Refills: 0 | Status: DISCONTINUED | OUTPATIENT
Start: 2018-02-12 | End: 2018-02-13

## 2018-02-12 RX ORDER — SODIUM CHLORIDE 9 MG/ML
1000 INJECTION INTRAMUSCULAR; INTRAVENOUS; SUBCUTANEOUS
Qty: 0 | Refills: 0 | Status: DISCONTINUED | OUTPATIENT
Start: 2018-02-12 | End: 2018-02-12

## 2018-02-12 RX ORDER — MORPHINE SULFATE 50 MG/1
2 CAPSULE, EXTENDED RELEASE ORAL EVERY 4 HOURS
Qty: 0 | Refills: 0 | Status: DISCONTINUED | OUTPATIENT
Start: 2018-02-12 | End: 2018-02-13

## 2018-02-12 RX ORDER — ACETAMINOPHEN 500 MG
650 TABLET ORAL EVERY 6 HOURS
Qty: 0 | Refills: 0 | Status: DISCONTINUED | OUTPATIENT
Start: 2018-02-12 | End: 2018-02-13

## 2018-02-12 RX ORDER — VANCOMYCIN HCL 1 G
1000 VIAL (EA) INTRAVENOUS EVERY 12 HOURS
Qty: 0 | Refills: 0 | Status: DISCONTINUED | OUTPATIENT
Start: 2018-02-12 | End: 2018-02-13

## 2018-02-12 RX ORDER — KETOROLAC TROMETHAMINE 30 MG/ML
15 SYRINGE (ML) INJECTION EVERY 4 HOURS
Qty: 0 | Refills: 0 | Status: DISCONTINUED | OUTPATIENT
Start: 2018-02-12 | End: 2018-02-12

## 2018-02-12 RX ORDER — SODIUM CHLORIDE 9 MG/ML
2000 INJECTION INTRAMUSCULAR; INTRAVENOUS; SUBCUTANEOUS ONCE
Qty: 0 | Refills: 0 | Status: COMPLETED | OUTPATIENT
Start: 2018-02-12 | End: 2018-02-12

## 2018-02-12 RX ORDER — MORPHINE SULFATE 50 MG/1
2 CAPSULE, EXTENDED RELEASE ORAL ONCE
Qty: 0 | Refills: 0 | Status: DISCONTINUED | OUTPATIENT
Start: 2018-02-12 | End: 2018-02-12

## 2018-02-12 RX ORDER — INFLUENZA VIRUS VACCINE 15; 15; 15; 15 UG/.5ML; UG/.5ML; UG/.5ML; UG/.5ML
0.5 SUSPENSION INTRAMUSCULAR ONCE
Qty: 0 | Refills: 0 | Status: DISCONTINUED | OUTPATIENT
Start: 2018-02-12 | End: 2018-02-13

## 2018-02-12 RX ORDER — SODIUM CHLORIDE 9 MG/ML
1000 INJECTION, SOLUTION INTRAVENOUS
Qty: 0 | Refills: 0 | Status: DISCONTINUED | OUTPATIENT
Start: 2018-02-12 | End: 2018-02-12

## 2018-02-12 RX ORDER — DEXTROSE MONOHYDRATE, SODIUM CHLORIDE, AND POTASSIUM CHLORIDE 50; .745; 4.5 G/1000ML; G/1000ML; G/1000ML
1000 INJECTION, SOLUTION INTRAVENOUS
Qty: 0 | Refills: 0 | Status: DISCONTINUED | OUTPATIENT
Start: 2018-02-12 | End: 2018-02-13

## 2018-02-12 RX ADMIN — ENOXAPARIN SODIUM 40 MILLIGRAM(S): 100 INJECTION SUBCUTANEOUS at 21:03

## 2018-02-12 RX ADMIN — Medication 250 MILLIGRAM(S): at 23:19

## 2018-02-12 RX ADMIN — MORPHINE SULFATE 2 MILLIGRAM(S): 50 CAPSULE, EXTENDED RELEASE ORAL at 09:56

## 2018-02-12 RX ADMIN — SODIUM CHLORIDE 125 MILLILITER(S): 9 INJECTION, SOLUTION INTRAVENOUS at 21:03

## 2018-02-12 RX ADMIN — DEXTROSE MONOHYDRATE, SODIUM CHLORIDE, AND POTASSIUM CHLORIDE 125 MILLILITER(S): 50; .745; 4.5 INJECTION, SOLUTION INTRAVENOUS at 23:19

## 2018-02-12 RX ADMIN — SODIUM CHLORIDE 2000 MILLILITER(S): 9 INJECTION INTRAMUSCULAR; INTRAVENOUS; SUBCUTANEOUS at 02:50

## 2018-02-12 RX ADMIN — SODIUM CHLORIDE 125 MILLILITER(S): 9 INJECTION, SOLUTION INTRAVENOUS at 17:30

## 2018-02-12 RX ADMIN — MORPHINE SULFATE 4 MILLIGRAM(S): 50 CAPSULE, EXTENDED RELEASE ORAL at 00:21

## 2018-02-12 RX ADMIN — SODIUM CHLORIDE 125 MILLILITER(S): 9 INJECTION, SOLUTION INTRAVENOUS at 09:56

## 2018-02-12 RX ADMIN — Medication 650 MILLIGRAM(S): at 23:18

## 2018-02-12 RX ADMIN — SODIUM CHLORIDE 125 MILLILITER(S): 9 INJECTION, SOLUTION INTRAVENOUS at 14:23

## 2018-02-12 RX ADMIN — MORPHINE SULFATE 2 MILLIGRAM(S): 50 CAPSULE, EXTENDED RELEASE ORAL at 10:20

## 2018-02-12 RX ADMIN — Medication 250 MILLIGRAM(S): at 12:41

## 2018-02-12 NOTE — H&P ADULT - ASSESSMENT
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

## 2018-02-12 NOTE — PROGRESS NOTE ADULT - SUBJECTIVE AND OBJECTIVE BOX
General Surgery Progress Note     S: No events overnight. Patient admitted with right buttock abscess    MEDICATIONS  (STANDING):  enoxaparin Injectable 40 milliGRAM(s) SubCutaneous every 24 hours  lactated ringers. 1000 milliLiter(s) (125 mL/Hr) IV Continuous <Continuous>  morphine  - Injectable 2 milliGRAM(s) IV Push once  vancomycin  IVPB 1000 milliGRAM(s) IV Intermittent every 12 hours    MEDICATIONS  (PRN):  acetaminophen   Tablet. 650 milliGRAM(s) Oral every 6 hours PRN mild and moderate pain  morphine  - Injectable 4 milliGRAM(s) IV Push every 4 hours PRN Severe Pain (7 - 10)  morphine  - Injectable 2 milliGRAM(s) IV Push every 4 hours PRN Moderate Pain (4 - 6)      Physical Exam:    Vital Signs Last 24 Hrs  T(C): 37.1 (12 Feb 2018 06:37), Max: 37.4 (11 Feb 2018 21:16)  T(F): 98.8 (12 Feb 2018 06:37), Max: 99.3 (11 Feb 2018 21:16)  HR: 75 (12 Feb 2018 06:37) (73 - 91)  BP: 92/51 (12 Feb 2018 06:37) (92/51 - 120/78)  BP(mean): --  RR: 18 (12 Feb 2018 06:37) (18 - 19)  SpO2: 99% (12 Feb 2018 06:37) (97% - 99%)      Gen: no acute distress, alert and oriented  Right buttock with erythema and induration, not actively draining but gauze with sanguinous drainage over the wound    LABS:                        13.4   14.14 )-----------( 525      ( 11 Feb 2018 23:50 )             42.9     02-11    142  |  104  |  19  ----------------------------<  85  4.0   |  22  |  0.60    Ca    10.1      11 Feb 2018 23:50    TPro  8.1  /  Alb  3.9  /  TBili  < 0.2<L>  /  DBili  x   /  AST  14  /  ALT  17  /  AlkPhos  92  02-11

## 2018-02-12 NOTE — PROGRESS NOTE ADULT - SUBJECTIVE AND OBJECTIVE BOX
Patient is a 52y old  Female who presents with a chief complaint of Butt abscess (12 Feb 2018 05:14)      SUBJECTIVE / OVERNIGHT EVENTS: Patient denies current fevers or chills. She is still having some buttox pain but states improved with current pain regimen.     Review of Systems:     MEDICATIONS  (STANDING):  enoxaparin Injectable 40 milliGRAM(s) SubCutaneous every 24 hours  lactated ringers. 1000 milliLiter(s) (125 mL/Hr) IV Continuous <Continuous>  vancomycin  IVPB 1000 milliGRAM(s) IV Intermittent every 12 hours    MEDICATIONS  (PRN):  acetaminophen   Tablet. 650 milliGRAM(s) Oral every 6 hours PRN mild and moderate pain  morphine  - Injectable 4 milliGRAM(s) IV Push every 4 hours PRN Severe Pain (7 - 10)  morphine  - Injectable 2 milliGRAM(s) IV Push every 4 hours PRN Moderate Pain (4 - 6)      PHYSICAL EXAM:  Vital Signs Last 24 Hrs  T(C): 36.7 (12 Feb 2018 10:20), Max: 37.4 (11 Feb 2018 21:16)  T(F): 98.1 (12 Feb 2018 10:20), Max: 99.3 (11 Feb 2018 21:16)  HR: 63 (12 Feb 2018 10:20) (63 - 91)  BP: 124/68 (12 Feb 2018 10:20) (92/51 - 124/68)  BP(mean): --  RR: 16 (12 Feb 2018 10:20) (16 - 19)  SpO2: 100% (12 Feb 2018 10:20) (97% - 100%)    I&O's Summary    GENERAL: NAD,   HEAD:  Atraumatic, Normocephalic  EYES: EOMI,conjunctiva and sclera clear  NECK: Supple,  CHEST/LUNG: Clear to auscultation bilaterally; No wheeze  HEART: Regular rate and rhythm; No murmurs, rubs, or gallops  ABDOMEN: Soft, Nontender, Nondistended; Bowel sounds present  EXTREMITIES:  2+ Peripheral Pulses, No clubbing, cyanosis, or edema  PSYCH: AAOx3  NEUROLOGY: non-focal  SKIN: R bottox area of eythema and warmth , TTP, and serosanguineous drainage on gauze     LABS:  CAPILLARY BLOOD GLUCOSE                              13.4   14.14 )-----------( 525      ( 11 Feb 2018 23:50 )             42.9     02-11    142  |  104  |  19  ----------------------------<  85  4.0   |  22  |  0.60    Ca    10.1      11 Feb 2018 23:50    TPro  8.1  /  Alb  3.9  /  TBili  < 0.2<L>  /  DBili  x   /  AST  14  /  ALT  17  /  AlkPhos  92  02-11    PT/INR - ( 11 Feb 2018 23:50 )   PT: 14.1 SEC;   INR: 1.22          PTT - ( 11 Feb 2018 23:50 )  PTT:31.0 SEC          RADIOLOGY & ADDITIONAL TESTS:    Imaging Personally Reviewed:    Consultant(s) Notes Reviewed:      Care Discussed with Consultants/Other Providers:

## 2018-02-12 NOTE — H&P ADULT - PROBLEM SELECTOR PLAN 2
- found below L medial part of eye, s/p resection   - patient is planned to have second surgery to do wider excision to be sure all malignant cells were removed   - patient reports no eye involvement - found below L medial part of eye, s/p resection   - patient is planned to have second surgery to do wider excision to be sure all malignant cells were removed   - patient reports no eye involvement  - has appointment on 2/13 with derm

## 2018-02-12 NOTE — H&P ADULT - HISTORY OF PRESENT ILLNESS
51 y/o F w/ no sig PMHx p/w worsening R butt abscess.   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   Patient reports initially having a pimple on her R buttock about 2 weeks ago. She reports putting war compresses on it and then earlier last week it broke open and began draining puss with blood for 2 days or so. It then stopped draining and became hard, purple and painful prompting her to come to the ED on 2/8.  She was diagnosed with cellulitis and was started on Keflex. Since starting the abx she has had no improvement and her R buttock has become more painful prompting her to come back to the ED. She has had no puss drainage since last week, but she has had some bleeding from it.   Reports some chills last week for a couple days, but no fevers or chills recently.   Denies any CP, SOB, recent travel, bug or animal bits, other trauma, prior episodes, dysuria or other symptoms.   Patient quit smoking 5 days ago. Was smoking 10 cigs a day for 35 years. Not taking any nicotine replacement.   Has 1 cat at home.     In ED,   T 99.3, HR 91, /78, RR 19, SpO2 97 on RA  Given Vanc 1gr, 2L NS, 4mg morphine   Surgery consulted by ED 53 y/o F w/ recent diagnosis of melanoma near L eye and no other sig PMHx p/w worsening R butt abscess.   Patient reports initially having a pimple on her R buttock about 2 weeks ago. She reports putting warm compresses on it and then earlier last week it broke open and began draining puss with blood for 2 days or so. It then stopped draining and became hard, purple and painful prompting her to come to the ED on 2/8.  She was diagnosed with cellulitis and was started on Keflex. Since starting the abx she has had no improvement and her R buttock has become more painful prompting her to come back to the ED. She has had no puss drainage since last week, but she has had some bleeding from it.   Reports some chills last week for a couple days, but no fevers or chills recently.   Denies any CP, SOB, recent travel, bug or animal bits, other trauma, prior episodes, dysuria or other symptoms.   Patient quit smoking 5 days ago. Was smoking 10 cigs a day for 35 years. Not taking any nicotine replacement.   Has 1 cat at home.     In ED,   T 99.3, HR 91, /78, RR 19, SpO2 97 on RA  Given Vanc 1gr, 2L NS, 4mg morphine   Surgery consulted by ED

## 2018-02-12 NOTE — PROGRESS NOTE ADULT - ASSESSMENT
52F presents with right buttock abscess  -plan for OR today  -NPO  -IVF/ IV antibiotics   -will pre op and consent

## 2018-02-12 NOTE — H&P ADULT - ATTENDING COMMENTS
53 y/o F p/w skin redness, pain and drainage on R buttock.  Presented to ED 3 days ago, and was given Keflex, but symptoms continued to worsen.  Evaluated by gen surgery in ED overnight with no drainable abscess noted on exam and CT.  Presently reports continued pain.  On exam: BP in 90's/50s without tachycardia.  Pt in mild distress 2/2 pain  Heart RRR, Lungs CTA B.  Abd s/nt/nd Normal BS  Skin: R buttock with erythema, induration extending from medial aspect to lateral aspect.  Small sinus tract with slow drainage of bloody fluid.    Labs notable for leukocytosis  1) R buttock cellulitis and abscess  - will continue vancomycin with plan to change to Bactrim following clinical improvement  2) Hypotension: possibly 2/2 sepsis, although does not have tachycardia  - will continue to monitor and hydrate with NS

## 2018-02-12 NOTE — PROGRESS NOTE ADULT - PROBLEM SELECTOR PLAN 2
Found below L medial part of eye, s/p resection   - patient is planned to have second surgery to do wider excision to be sure all malignant cells were removed   - patient reports no eye involvement  - has appointment on 2/13 with derm

## 2018-02-12 NOTE — H&P ADULT - PROBLEM SELECTOR PLAN 1
- seen by surgery, no fluid collection seen on CT that can be drained   - given dose of Vanc in Ed   - pain control - seen by surgery, no fluid collection seen on CT that can be drained   - given dose of Vanc in ED, will change Bactrim DS 1tab BID   - pain control

## 2018-02-12 NOTE — PRE-OP CHECKLIST - TO WHOM
RACHNA Goodwin Christa RN/ surgery on hold for pt eating at 4pm Christa, RN/ surgery on hold for pt eating at 4pm/ 3 [place] RN aware

## 2018-02-12 NOTE — PROGRESS NOTE ADULT - ASSESSMENT
53 y/o F w/ recent diagnosis of melanoma near L eye and no other sig PMHx a/w R butt cellulitis possibly c/b abscess

## 2018-02-12 NOTE — H&P ADULT - NSHPPHYSICALEXAM_GEN_ALL_CORE
Vital Signs Last 24 Hrs  T(C): 37 (12 Feb 2018 04:54), Max: 37.4 (11 Feb 2018 21:16)  T(F): 98.6 (12 Feb 2018 04:54), Max: 99.3 (11 Feb 2018 21:16)  HR: 86 (12 Feb 2018 04:54) (73 - 91)  BP: 105/58 (12 Feb 2018 04:54) (94/49 - 120/78)  BP(mean): --  RR: 18 (12 Feb 2018 04:54) (18 - 19)  SpO2: 97% (12 Feb 2018 04:54) (97% - 98%)    PHYSICAL EXAM:    GENERAL: Comfortable, no acute distress   HEAD:  Normocephalic, atraumatic  EYES: EOMI, PERRLA  HEENT: Moist mucous membranes  NECK: Supple, No JVD  NERVOUS SYSTEM:  Alert & Oriented X3, Motor Strength 5/5 B/L upper and lower extremities  CHEST/LUNG: Clear to auscultation bilaterally  HEART: Regular rate and rhythm, no murmur   ABDOMEN: Soft, Nontender, Nondistended, Bowel sounds present  EXTREMITIES:   No clubbing, cyanosis, or edema  MUSCULOSKELETAL: No muscle tenderness, no joint tenderness  SKIN:  warm and dry, + large area of erythema and warmth on R buttock, painful and firm to touch, small laceration on medial R buttock draining blood.

## 2018-02-12 NOTE — PROGRESS NOTE ADULT - ATTENDING COMMENTS
I have personally interviewed and examined this patient, reviewed pertinent labs and imaging, and discussed the case with colleagues, residents, and physician assistants on B Team rounds.    The active care issues are:  1. right buttock abscess    Patient ate crackers at 4pm despite instruction to remain NPO.  OR delayed 8 hrs for risk of aspiration.

## 2018-02-12 NOTE — H&P ADULT - NEGATIVE GASTROINTESTINAL SYMPTOMS
no abdominal pain/no melena/no nausea/no vomiting/no constipation/no change in bowel habits/no diarrhea

## 2018-02-12 NOTE — CHART NOTE - NSCHARTNOTEFT_GEN_A_CORE
Spoke to surgery team CHARMAINE Tapia, patient transfer to surgery service at this time for possible OR today, remains NPO, patient alert and oriented x 3, aware of the current status, informed the primary RN to administer fluids for now

## 2018-02-12 NOTE — H&P ADULT - NSHPLABSRESULTS_GEN_ALL_CORE
13.4   14.14 )-----------( 525      ( 11 Feb 2018 23:50 )             42.9       02-11    142  |  104  |  19  ----------------------------<  85  4.0   |  22  |  0.60    Ca    10.1      11 Feb 2018 23:50    TPro  8.1  /  Alb  3.9  /  TBili  < 0.2<L>  /  DBili  x   /  AST  14  /  ALT  17  /  AlkPhos  92  02-11              PT/INR - ( 11 Feb 2018 23:50 )   PT: 14.1 SEC;   INR: 1.22          PTT - ( 11 Feb 2018 23:50 )  PTT:31.0 SEC    Lactate Trend    Blood Gas Venous - Lactate: 1.2: Please note updated reference range. mmol/L (02.11.18 @ 23:45)        < from: CT Pelvis w/ IV Cont (02.12.18 @ 01:11) >    IMPRESSION:   A patchy amorphous soft tissue attenuation within the right buttock   subcutaneous tissues with adjacent fat stranding/inflammatory changes and   overlying skin thickening. No discrete rim-enhancing fluid collection.     < end of copied text >    < from: Xray Pelvis AP only (02.11.18 @ 23:34) >    ******PRELIMINARY REPORT******            INTERPRETATION:  No acute displaced pelvic fracture.    Follow up official report.    < end of copied text >

## 2018-02-12 NOTE — H&P ADULT - NSHPSOCIALHISTORY_GEN_ALL_CORE
Social History:    Marital Status:  (  X )    (   ) Single    (   )    (  )   Lives with: (  ) alone  (  ) children   ( X ) spouse   (  ) parents  (  ) other    Substance Use (street drugs): ( X ) never used  (  ) other:  Tobacco Usage:  (   ) never smoked   (  X ) former smoker - quit 5 days ago, smoked 10 cigarettes per day x35 yrs   Alcohol Usage: social drinker

## 2018-02-12 NOTE — CONSULT NOTE ADULT - SUBJECTIVE AND OBJECTIVE BOX
GENERAL SURGERY CONSULT    Consulting surgical team: ALYSA (pager [phone])  Consulting attending: Leticia Valencia  Patient seen and examined: 02-12-18 @ 04:09    HPI:  Patient is a 52y Female - no significant PMH/PSH - presenting with painful swelling of her right buttock, associated with drainage of bloody fluid, x 2 weeks. Patient reports that, initially, there was a large pimple of her right buttock which she drained at home. Over the following days, the pimple continued to drain bloody and sometimes purulent fluid, and the area of redness and painful swelling extended outward from the original pimple site. She visited the [facility] ED on 2/8 and was discharged on a PO course of antibiotics (Keflex). She reports that she was having intermittent chills prior to the initiation of antibiotics, but beyond that there was no discernible improvement. Her pain continued unabated.      PAST MEDICAL HISTORY:  Injury of left ankle, subsequent encounter  No pertinent past medical history      PAST SURGICAL HISTORY:  No significant past surgical history      ALLERGIES:  No Known Allergies      MEDICATIONS  (STANDING):    MEDICATIONS  (PRN):      VITALS & I/Os:  Vital Signs Last 24 Hrs  T(C): 37.2 (12 Feb 2018 02:31), Max: 37.4 (11 Feb 2018 21:16)  T(F): 99 (12 Feb 2018 02:31), Max: 99.3 (11 Feb 2018 21:16)  HR: 73 (12 Feb 2018 02:31) (73 - 91)  BP: 105/61 (12 Feb 2018 03:52) (94/49 - 120/78)  BP(mean): --  RR: 18 (12 Feb 2018 02:31) (18 - 19)  SpO2: 97% (12 Feb 2018 02:31) (97% - 98%)  CAPILLARY BLOOD GLUCOSE          I&O's Summary        GEN: NAD, alert and oriented x 3  HEENT: Small (< 1 cm) dark pigmented lesion medial to the left canthus (patient reports this area was biopsied recently and found to be 'cancer')  CHEST: Symmetrical chest rise, breath sounds CTAB  HEART: RRR, non-muffled heart sounds  ABD: Soft, non-tender, non-distended. There is a large (approx. 12 x 8 cm) area of erythema/edema on the posterior surface of the right buttock, far removed from the perineum. On the medial aspect of this indurated and tender area, there is a punctate skin defect which is draining a small amount of seropurulent fluid. No discrete area of fluctuance. No crepitus or bullae formation.  EXT/VASC: No edema/erythema or deformity. Warm, cap refill < 2 sec. Motor and sensory function intact.           LABS:                        13.4   14.14 )-----------( 525      ( 11 Feb 2018 23:50 )             42.9     02-11    142  |  104  |  19  ----------------------------<  85  4.0   |  22  |  0.60    Ca    10.1      11 Feb 2018 23:50    TPro  8.1  /  Alb  3.9  /  TBili  < 0.2<L>  /  DBili  x   /  AST  14  /  ALT  17  /  AlkPhos  92  02-11    Lactate:    PT/INR - ( 11 Feb 2018 23:50 )   PT: 14.1 SEC;   INR: 1.22          PTT - ( 11 Feb 2018 23:50 )  PTT:31.0 SEC        02-11 @ 23:45  1.2        IMAGING:  < from: CT Pelvis w/ IV Cont (02.12.18 @ 01:11) >  IMPRESSION:   A patchy amorphous soft tissue attenuation within the right buttock   subcutaneous tissues with adjacent fat stranding/inflammatory changes and   overlying skin thickening. No discrete rim-enhancing fluid collection.     < end of copied text >      ASSESSMENT & PLAN  52y with cellulitis of the right buttock, persisting on home course of PO antibiotics. Would recommend admission for IV antibiosis and pain control. However, no surgical intervention indicated at this time - as there is no drainable fluid collection and no necrotizing soft tissue infection requiring operative debridement. Will follow to assess clinical improvement.      Luis Eduardo Jenkins  R3, General Surgery

## 2018-02-12 NOTE — PROGRESS NOTE ADULT - PROBLEM SELECTOR PLAN 1
Pt was w/ HR > 90 on admission w/ leukocytosis meeting criteria for sepsis 2/2 to R buttox cellulitis and abscess. Pt was not improving on out pt keflex. Furthermore she reported pus drainage previously which increases likely russell of MRSA. Pt s/p CT that did not appreciated any fluid collection that can be drained . However, patient w/ continued drainage.   -pt reevaluated by surgery w/ plan to take pt to OR today  -c/w vancomycin for now, monitor vanc trough  -f/u blood cx    - pain control

## 2018-02-13 ENCOUNTER — TRANSCRIPTION ENCOUNTER (OUTPATIENT)
Age: 53
End: 2018-02-13

## 2018-02-13 VITALS
DIASTOLIC BLOOD PRESSURE: 79 MMHG | RESPIRATION RATE: 17 BRPM | OXYGEN SATURATION: 97 % | SYSTOLIC BLOOD PRESSURE: 119 MMHG | TEMPERATURE: 98 F | HEART RATE: 79 BPM

## 2018-02-13 DIAGNOSIS — R69 ILLNESS, UNSPECIFIED: ICD-10-CM

## 2018-02-13 LAB
SPECIMEN SOURCE: SIGNIFICANT CHANGE UP
SPECIMEN SOURCE: SIGNIFICANT CHANGE UP

## 2018-02-13 PROCEDURE — 10060 I&D ABSCESS SIMPLE/SINGLE: CPT | Mod: GC

## 2018-02-13 RX ORDER — ACETAMINOPHEN 500 MG
2 TABLET ORAL
Qty: 0 | Refills: 0 | COMMUNITY
Start: 2018-02-13

## 2018-02-13 RX ORDER — OXYCODONE HYDROCHLORIDE 5 MG/1
1 TABLET ORAL
Qty: 12 | Refills: 0 | OUTPATIENT
Start: 2018-02-13 | End: 2018-02-15

## 2018-02-13 RX ORDER — FENTANYL CITRATE 50 UG/ML
50 INJECTION INTRAVENOUS
Qty: 0 | Refills: 0 | Status: DISCONTINUED | OUTPATIENT
Start: 2018-02-13 | End: 2018-02-13

## 2018-02-13 RX ORDER — SODIUM CHLORIDE 9 MG/ML
500 INJECTION, SOLUTION INTRAVENOUS ONCE
Qty: 0 | Refills: 0 | Status: COMPLETED | OUTPATIENT
Start: 2018-02-13 | End: 2018-02-13

## 2018-02-13 RX ORDER — IBUPROFEN 200 MG
400 TABLET ORAL EVERY 6 HOURS
Qty: 0 | Refills: 0 | Status: DISCONTINUED | OUTPATIENT
Start: 2018-02-13 | End: 2018-02-13

## 2018-02-13 RX ORDER — FENTANYL CITRATE 50 UG/ML
25 INJECTION INTRAVENOUS
Qty: 0 | Refills: 0 | Status: DISCONTINUED | OUTPATIENT
Start: 2018-02-13 | End: 2018-02-13

## 2018-02-13 RX ORDER — ONDANSETRON 8 MG/1
4 TABLET, FILM COATED ORAL ONCE
Qty: 0 | Refills: 0 | Status: DISCONTINUED | OUTPATIENT
Start: 2018-02-13 | End: 2018-02-13

## 2018-02-13 RX ORDER — HYDROMORPHONE HYDROCHLORIDE 2 MG/ML
0.25 INJECTION INTRAMUSCULAR; INTRAVENOUS; SUBCUTANEOUS
Qty: 0 | Refills: 0 | Status: DISCONTINUED | OUTPATIENT
Start: 2018-02-13 | End: 2018-02-13

## 2018-02-13 RX ORDER — HYDROMORPHONE HYDROCHLORIDE 2 MG/ML
0.5 INJECTION INTRAMUSCULAR; INTRAVENOUS; SUBCUTANEOUS
Qty: 0 | Refills: 0 | Status: DISCONTINUED | OUTPATIENT
Start: 2018-02-13 | End: 2018-02-13

## 2018-02-13 RX ADMIN — FENTANYL CITRATE 25 MICROGRAM(S): 50 INJECTION INTRAVENOUS at 02:45

## 2018-02-13 RX ADMIN — Medication 650 MILLIGRAM(S): at 01:06

## 2018-02-13 RX ADMIN — FENTANYL CITRATE 25 MICROGRAM(S): 50 INJECTION INTRAVENOUS at 02:26

## 2018-02-13 RX ADMIN — DEXTROSE MONOHYDRATE, SODIUM CHLORIDE, AND POTASSIUM CHLORIDE 125 MILLILITER(S): 50; .745; 4.5 INJECTION, SOLUTION INTRAVENOUS at 11:18

## 2018-02-13 RX ADMIN — SODIUM CHLORIDE 1000 MILLILITER(S): 9 INJECTION, SOLUTION INTRAVENOUS at 02:32

## 2018-02-13 RX ADMIN — HYDROMORPHONE HYDROCHLORIDE 0.5 MILLIGRAM(S): 2 INJECTION INTRAMUSCULAR; INTRAVENOUS; SUBCUTANEOUS at 03:15

## 2018-02-13 RX ADMIN — DEXTROSE MONOHYDRATE, SODIUM CHLORIDE, AND POTASSIUM CHLORIDE 125 MILLILITER(S): 50; .745; 4.5 INJECTION, SOLUTION INTRAVENOUS at 03:30

## 2018-02-13 RX ADMIN — HYDROMORPHONE HYDROCHLORIDE 0.5 MILLIGRAM(S): 2 INJECTION INTRAMUSCULAR; INTRAVENOUS; SUBCUTANEOUS at 02:57

## 2018-02-13 NOTE — DISCHARGE NOTE ADULT - CARE PLAN
Principal Discharge DX:	Cellulitis of buttock  Goal:	Pain control and resolution of symptoms  Assessment and plan of treatment:	WOUND CARE: packed daily with dry Kerlix   BATHING: Please do not submerge wound underwater. You may shower and/or sponge bathe.  ACTIVITY: No heavy lifting anything more than 10-15lbs or straining. Otherwise, you may return to your usual level of physical activity. If you are taking narcotic pain medication (such as Percocet), do NOT drive a car, operate machinery or make important decisions.  DIET: Low fiber diet  NOTIFY YOUR SURGEON IF: You have any bleeding that does not stop, any pus draining from your wound, any fever (over 100.4 F) or chills, persistent nausea/vomiting with inability to tolerate food or liquids, persistent diarrhea, or if your pain is not controlled on your discharge pain medications.  FOLLOW-UP:  1. Please call to make a follow-up appointment within one week of discharge   2. Please follow up with your primary care physician in one week regarding your hospitalization. Principal Discharge DX:	Cellulitis of buttock  Goal:	Pain control and resolution of symptoms  Assessment and plan of treatment:	WOUND CARE: packed daily with dry Kerlix   BATHING: Please do not submerge wound underwater. You may shower and/or sponge bathe.  ACTIVITY: No heavy lifting anything more than 10-15lbs or straining. Otherwise, you may return to your usual level of physical activity. If you are taking narcotic pain medication (such as Percocet), do NOT drive a car, operate machinery or make important decisions.  DIET: Low fiber diet  NOTIFY YOUR SURGEON IF: You have any bleeding that does not stop, any pus draining from your wound, any fever (over 100.4 F) or chills, persistent nausea/vomiting with inability to tolerate food or liquids, persistent diarrhea, or if your pain is not controlled on your discharge pain medications.  FOLLOW-UP:  1. Please call to make a follow-up appointment within one week of discharge with Dr Fregoso  2. Please follow up with your primary care physician in one week regarding your hospitalization. Principal Discharge DX:	Cellulitis of buttock  Goal:	Pain control and resolution of symptoms  Assessment and plan of treatment:	WOUND CARE: packed daily with moist kerlix (in normal saline) and cover with dry guaze.  BATHING: Please do not submerge wound underwater. You may shower and/or sponge bathe.  ACTIVITY: No heavy lifting anything more than 10-15lbs or straining. Otherwise, you may return to your usual level of physical activity. If you are taking narcotic pain medication (such as Percocet), do NOT drive a car, operate machinery or make important decisions.  DIET: Low fiber diet  NOTIFY YOUR SURGEON IF: You have any bleeding that does not stop, any pus draining from your wound, any fever (over 100.4 F) or chills, persistent nausea/vomiting with inability to tolerate food or liquids, persistent diarrhea, or if your pain is not controlled on your discharge pain medications.  FOLLOW-UP:  1. Please call to make a follow-up appointment within one week of discharge with Dr Fregoso  2. Please follow up with your primary care physician in one week regarding your hospitalization.

## 2018-02-13 NOTE — DISCHARGE NOTE ADULT - PLAN OF CARE
Pain control and resolution of symptoms WOUND CARE: packed daily with dry Kerlix   BATHING: Please do not submerge wound underwater. You may shower and/or sponge bathe.  ACTIVITY: No heavy lifting anything more than 10-15lbs or straining. Otherwise, you may return to your usual level of physical activity. If you are taking narcotic pain medication (such as Percocet), do NOT drive a car, operate machinery or make important decisions.  DIET: Low fiber diet  NOTIFY YOUR SURGEON IF: You have any bleeding that does not stop, any pus draining from your wound, any fever (over 100.4 F) or chills, persistent nausea/vomiting with inability to tolerate food or liquids, persistent diarrhea, or if your pain is not controlled on your discharge pain medications.  FOLLOW-UP:  1. Please call to make a follow-up appointment within one week of discharge   2. Please follow up with your primary care physician in one week regarding your hospitalization. WOUND CARE: packed daily with dry Kerlix   BATHING: Please do not submerge wound underwater. You may shower and/or sponge bathe.  ACTIVITY: No heavy lifting anything more than 10-15lbs or straining. Otherwise, you may return to your usual level of physical activity. If you are taking narcotic pain medication (such as Percocet), do NOT drive a car, operate machinery or make important decisions.  DIET: Low fiber diet  NOTIFY YOUR SURGEON IF: You have any bleeding that does not stop, any pus draining from your wound, any fever (over 100.4 F) or chills, persistent nausea/vomiting with inability to tolerate food or liquids, persistent diarrhea, or if your pain is not controlled on your discharge pain medications.  FOLLOW-UP:  1. Please call to make a follow-up appointment within one week of discharge with Dr Fregoso  2. Please follow up with your primary care physician in one week regarding your hospitalization. WOUND CARE: packed daily with moist kerlix (in normal saline) and cover with dry guaze.  BATHING: Please do not submerge wound underwater. You may shower and/or sponge bathe.  ACTIVITY: No heavy lifting anything more than 10-15lbs or straining. Otherwise, you may return to your usual level of physical activity. If you are taking narcotic pain medication (such as Percocet), do NOT drive a car, operate machinery or make important decisions.  DIET: Low fiber diet  NOTIFY YOUR SURGEON IF: You have any bleeding that does not stop, any pus draining from your wound, any fever (over 100.4 F) or chills, persistent nausea/vomiting with inability to tolerate food or liquids, persistent diarrhea, or if your pain is not controlled on your discharge pain medications.  FOLLOW-UP:  1. Please call to make a follow-up appointment within one week of discharge with Dr Fregoso  2. Please follow up with your primary care physician in one week regarding your hospitalization.

## 2018-02-13 NOTE — BRIEF OPERATIVE NOTE - OPERATION/FINDINGS
large 15cm area of induration and fluctuance with small pin-hole of purulent drainage. 10cm incision through fluctuance with evacuation of purulent fluid. The wound was irrigated with warm saline. packed with moist kerlix. The wound extends more laterally than the incision.

## 2018-02-13 NOTE — BRIEF OPERATIVE NOTE - PROCEDURE
<<-----Click on this checkbox to enter Procedure Incision and drainage of abscess of right buttock  02/13/2018    Active  [id number]

## 2018-02-13 NOTE — DISCHARGE NOTE ADULT - HOME CARE AGENCY
[facility] [phone]    Rn to call to schedule home visit and teach initial wd packing within 24hr after hospital d/c. soc 2/14/18

## 2018-02-13 NOTE — DISCHARGE NOTE ADULT - CARE PROVIDER_API CALL
Yara Fregoso), DieticianNutrition; Surgery; Surgical Critical Care  [address]  [address] [address]  Phone: [phone]  Fax: [phone]

## 2018-02-13 NOTE — DISCHARGE NOTE ADULT - PATIENT PORTAL LINK FT
You can access the [facility], offered by [facility], by registering with the following website: [email or web address]

## 2018-02-13 NOTE — DISCHARGE NOTE ADULT - MEDICATION SUMMARY - MEDICATIONS TO TAKE
I will START or STAY ON the medications listed below when I get home from the hospital:     mg oral tablet  -- 1 tab(s) by mouth every 8 hours  -- Do not take this drug if you are pregnant.  It is very important that you take or use this exactly as directed.  Do not skip doses or discontinue unless directed by your doctor.  May cause drowsiness or dizziness.  Obtain medical advice before taking any non-prescription drugs as some may affect the action of this medication.  Take with food or milk.    -- Indication: For Pain    acetaminophen 325 mg oral tablet  -- 2 tab(s) by mouth every 6 hours  -- Indication: For Pain    oxyCODONE 5 mg oral capsule  -- 1 cap(s) by mouth every 6 hours as needed for Severe Pain MDD:4  -- Caution federal law prohibits the transfer of this drug to any person other  than the person for whom it was prescribed.  It is very important that you take or use this exactly as directed.  Do not skip doses or discontinue unless directed by your doctor.  May cause drowsiness.  Alcohol may intensify this effect.  Use care when operating dangerous machinery.  This prescription cannot be refilled.  Using more of this medication than prescribed may cause serious breathing problems.    -- Indication: For For Severe pain

## 2018-02-14 LAB
GRAM STN WND: SIGNIFICANT CHANGE UP
GRAM STN WND: SIGNIFICANT CHANGE UP
SPECIMEN SOURCE: SIGNIFICANT CHANGE UP
SPECIMEN SOURCE: SIGNIFICANT CHANGE UP

## 2018-02-15 LAB
-  CEFAZOLIN: SIGNIFICANT CHANGE UP
-  CIPROFLOXACIN: SIGNIFICANT CHANGE UP
-  CLINDAMYCIN: SIGNIFICANT CHANGE UP
-  DAPTOMYCIN: SIGNIFICANT CHANGE UP
-  ERYTHROMYCIN: SIGNIFICANT CHANGE UP
-  GENTAMICIN: SIGNIFICANT CHANGE UP
-  LINEZOLID: SIGNIFICANT CHANGE UP
-  MOXIFLOXACIN(AEROBIC): SIGNIFICANT CHANGE UP
-  OXACILLIN: SIGNIFICANT CHANGE UP
-  PENICILLIN: SIGNIFICANT CHANGE UP
-  RIFAMPIN.: SIGNIFICANT CHANGE UP
-  TETRACYCLINE: SIGNIFICANT CHANGE UP
-  TRIMETHOPRIM/SULFAMETHOXAZOLE: SIGNIFICANT CHANGE UP
-  VANCOMYCIN: SIGNIFICANT CHANGE UP
CULTURE - SURGICAL SITE: SIGNIFICANT CHANGE UP
CULTURE - SURGICAL SITE: SIGNIFICANT CHANGE UP
GRAM STN WND: SIGNIFICANT CHANGE UP
METHOD TYPE: SIGNIFICANT CHANGE UP
ORGANISM # SPEC MICROSCOPIC CNT: SIGNIFICANT CHANGE UP
ORGANISM # SPEC MICROSCOPIC CNT: SIGNIFICANT CHANGE UP

## 2018-02-17 LAB
BACTERIA BLD CULT: SIGNIFICANT CHANGE UP
BACTERIA BLD CULT: SIGNIFICANT CHANGE UP

## 2018-02-20 ENCOUNTER — APPOINTMENT (OUTPATIENT)
Dept: DERMATOLOGY | Facility: CLINIC | Age: 53
End: 2018-02-20
Payer: MEDICAID

## 2018-02-20 DIAGNOSIS — D48.5 NEOPLASM OF UNCERTAIN BEHAVIOR OF SKIN: ICD-10-CM

## 2018-02-20 PROCEDURE — 99214 OFFICE O/P EST MOD 30 MIN: CPT

## 2018-02-22 ENCOUNTER — APPOINTMENT (OUTPATIENT)
Dept: TRAUMA SURGERY | Facility: CLINIC | Age: 53
End: 2018-02-22
Payer: MEDICAID

## 2018-02-22 VITALS
HEIGHT: 65 IN | SYSTOLIC BLOOD PRESSURE: 118 MMHG | WEIGHT: 158 LBS | DIASTOLIC BLOOD PRESSURE: 81 MMHG | BODY MASS INDEX: 26.33 KG/M2 | HEART RATE: 76 BPM

## 2018-02-22 PROCEDURE — 99024 POSTOP FOLLOW-UP VISIT: CPT

## 2018-02-26 ENCOUNTER — APPOINTMENT (OUTPATIENT)
Dept: TRAUMA SURGERY | Facility: CLINIC | Age: 53
End: 2018-02-26

## 2018-03-02 ENCOUNTER — APPOINTMENT (OUTPATIENT)
Dept: DERMATOLOGY | Facility: CLINIC | Age: 53
End: 2018-03-02

## 2018-03-05 ENCOUNTER — APPOINTMENT (OUTPATIENT)
Dept: TRAUMA SURGERY | Facility: CLINIC | Age: 53
End: 2018-03-05
Payer: MEDICAID

## 2018-03-05 VITALS
BODY MASS INDEX: 26.66 KG/M2 | HEART RATE: 80 BPM | TEMPERATURE: 98.2 F | HEIGHT: 65 IN | DIASTOLIC BLOOD PRESSURE: 83 MMHG | WEIGHT: 160 LBS | SYSTOLIC BLOOD PRESSURE: 125 MMHG

## 2018-03-05 DIAGNOSIS — Z09 ENCOUNTER FOR FOLLOW-UP EXAMINATION AFTER COMPLETED TREATMENT FOR CONDITIONS OTHER THAN MALIGNANT NEOPLASM: ICD-10-CM

## 2018-03-05 PROCEDURE — 99212 OFFICE O/P EST SF 10 MIN: CPT

## 2018-03-11 PROBLEM — Z09 POSTOP CHECK: Status: ACTIVE | Noted: 2018-02-22

## 2018-05-22 ENCOUNTER — MOBILE ON CALL (OUTPATIENT)
Age: 53
End: 2018-05-22

## 2018-05-22 ENCOUNTER — APPOINTMENT (OUTPATIENT)
Dept: DERMATOLOGY | Facility: CLINIC | Age: 53
End: 2018-05-22
Payer: MEDICAID

## 2018-05-22 DIAGNOSIS — Z82.62 FAMILY HISTORY OF OSTEOPOROSIS: ICD-10-CM

## 2018-05-22 DIAGNOSIS — Z87.39 PERSONAL HISTORY OF OTHER DISEASES OF THE MUSCULOSKELETAL SYSTEM AND CONNECTIVE TISSUE: ICD-10-CM

## 2018-05-22 PROCEDURE — 17311 MOHS 1 STAGE H/N/HF/G: CPT

## 2018-08-07 ENCOUNTER — APPOINTMENT (OUTPATIENT)
Dept: DERMATOLOGY | Facility: CLINIC | Age: 53
End: 2018-08-07
Payer: OTHER GOVERNMENT

## 2018-08-07 VITALS — SYSTOLIC BLOOD PRESSURE: 134 MMHG | DIASTOLIC BLOOD PRESSURE: 80 MMHG

## 2018-08-07 DIAGNOSIS — L90.5 SCAR CONDITIONS AND FIBROSIS OF SKIN: ICD-10-CM

## 2018-08-07 DIAGNOSIS — L81.4 OTHER MELANIN HYPERPIGMENTATION: ICD-10-CM

## 2018-08-07 PROBLEM — S99.912D: Chronic | Status: ACTIVE | Noted: 2017-08-05

## 2018-08-07 PROCEDURE — 99214 OFFICE O/P EST MOD 30 MIN: CPT

## 2018-11-02 ENCOUNTER — APPOINTMENT (OUTPATIENT)
Dept: MAMMOGRAPHY | Facility: IMAGING CENTER | Age: 53
End: 2018-11-02
Payer: OTHER GOVERNMENT

## 2018-11-02 ENCOUNTER — OUTPATIENT (OUTPATIENT)
Dept: OUTPATIENT SERVICES | Facility: HOSPITAL | Age: 53
LOS: 1 days | End: 2018-11-02
Payer: OTHER GOVERNMENT

## 2018-11-02 DIAGNOSIS — Z12.31 ENCOUNTER FOR SCREENING MAMMOGRAM FOR MALIGNANT NEOPLASM OF BREAST: ICD-10-CM

## 2018-11-02 PROCEDURE — 77067 SCR MAMMO BI INCL CAD: CPT

## 2018-11-02 PROCEDURE — 77063 BREAST TOMOSYNTHESIS BI: CPT

## 2018-11-02 PROCEDURE — 77063 BREAST TOMOSYNTHESIS BI: CPT | Mod: 26

## 2018-11-02 PROCEDURE — 77067 SCR MAMMO BI INCL CAD: CPT | Mod: 26

## 2018-11-07 ENCOUNTER — OUTPATIENT (OUTPATIENT)
Dept: OUTPATIENT SERVICES | Facility: HOSPITAL | Age: 53
LOS: 1 days | End: 2018-11-07
Payer: OTHER GOVERNMENT

## 2018-11-07 ENCOUNTER — APPOINTMENT (OUTPATIENT)
Dept: ULTRASOUND IMAGING | Facility: IMAGING CENTER | Age: 53
End: 2018-11-07
Payer: OTHER GOVERNMENT

## 2018-11-07 DIAGNOSIS — Z00.8 ENCOUNTER FOR OTHER GENERAL EXAMINATION: ICD-10-CM

## 2018-11-07 PROCEDURE — 76642 ULTRASOUND BREAST LIMITED: CPT

## 2018-11-07 PROCEDURE — 76642 ULTRASOUND BREAST LIMITED: CPT | Mod: 26,RT

## 2018-11-23 NOTE — ED ADULT NURSE NOTE - NS ED NOTE ABUSE RESPONSE YN
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     
Yes

## 2019-01-10 ENCOUNTER — EMERGENCY (EMERGENCY)
Facility: HOSPITAL | Age: 54
LOS: 1 days | Discharge: HOME | End: 2019-01-10
Attending: EMERGENCY MEDICINE | Admitting: EMERGENCY MEDICINE
Payer: OTHER GOVERNMENT

## 2019-01-10 VITALS
TEMPERATURE: 99 F | DIASTOLIC BLOOD PRESSURE: 85 MMHG | OXYGEN SATURATION: 99 % | HEART RATE: 70 BPM | RESPIRATION RATE: 16 BRPM | SYSTOLIC BLOOD PRESSURE: 127 MMHG

## 2019-01-10 LAB
ALBUMIN SERPL ELPH-MCNC: 4.2 G/DL — SIGNIFICANT CHANGE UP (ref 3.3–5)
ALP SERPL-CCNC: 95 U/L — SIGNIFICANT CHANGE UP (ref 40–120)
ALT FLD-CCNC: 18 U/L — SIGNIFICANT CHANGE UP (ref 4–33)
ANION GAP SERPL CALC-SCNC: 11 MEQ/L — SIGNIFICANT CHANGE UP (ref 7–14)
AST SERPL-CCNC: 14 U/L — SIGNIFICANT CHANGE UP (ref 4–32)
BASOPHILS # BLD AUTO: 0.06 K/UL — SIGNIFICANT CHANGE UP (ref 0–0.2)
BASOPHILS NFR BLD AUTO: 0.7 % — SIGNIFICANT CHANGE UP (ref 0–2)
BILIRUB SERPL-MCNC: < 0.2 MG/DL — LOW (ref 0.2–1.2)
BUN SERPL-MCNC: 22 MG/DL — SIGNIFICANT CHANGE UP (ref 7–23)
CALCIUM SERPL-MCNC: 10 MG/DL — SIGNIFICANT CHANGE UP (ref 8.4–10.5)
CHLORIDE SERPL-SCNC: 105 MMOL/L — SIGNIFICANT CHANGE UP (ref 98–107)
CO2 SERPL-SCNC: 22 MMOL/L — SIGNIFICANT CHANGE UP (ref 22–31)
CREAT SERPL-MCNC: 0.78 MG/DL — SIGNIFICANT CHANGE UP (ref 0.5–1.3)
EOSINOPHIL # BLD AUTO: 0.31 K/UL — SIGNIFICANT CHANGE UP (ref 0–0.5)
EOSINOPHIL NFR BLD AUTO: 3.4 % — SIGNIFICANT CHANGE UP (ref 0–6)
GLUCOSE SERPL-MCNC: 94 MG/DL — SIGNIFICANT CHANGE UP (ref 70–99)
HCT VFR BLD CALC: 40.6 % — SIGNIFICANT CHANGE UP (ref 34.5–45)
HGB BLD-MCNC: 12.9 G/DL — SIGNIFICANT CHANGE UP (ref 11.5–15.5)
IMM GRANULOCYTES NFR BLD AUTO: 0.2 % — SIGNIFICANT CHANGE UP (ref 0–1.5)
LYMPHOCYTES # BLD AUTO: 3.97 K/UL — HIGH (ref 1–3.3)
LYMPHOCYTES # BLD AUTO: 43.4 % — SIGNIFICANT CHANGE UP (ref 13–44)
MCHC RBC-ENTMCNC: 29.9 PG — SIGNIFICANT CHANGE UP (ref 27–34)
MCHC RBC-ENTMCNC: 31.8 % — LOW (ref 32–36)
MCV RBC AUTO: 94.2 FL — SIGNIFICANT CHANGE UP (ref 80–100)
MONOCYTES # BLD AUTO: 0.75 K/UL — SIGNIFICANT CHANGE UP (ref 0–0.9)
MONOCYTES NFR BLD AUTO: 8.2 % — SIGNIFICANT CHANGE UP (ref 2–14)
NEUTROPHILS # BLD AUTO: 4.03 K/UL — SIGNIFICANT CHANGE UP (ref 1.8–7.4)
NEUTROPHILS NFR BLD AUTO: 44.1 % — SIGNIFICANT CHANGE UP (ref 43–77)
NRBC # FLD: 0 K/UL — LOW (ref 25–125)
PLATELET # BLD AUTO: 362 K/UL — SIGNIFICANT CHANGE UP (ref 150–400)
PMV BLD: 9.6 FL — SIGNIFICANT CHANGE UP (ref 7–13)
POTASSIUM SERPL-MCNC: 4.1 MMOL/L — SIGNIFICANT CHANGE UP (ref 3.5–5.3)
POTASSIUM SERPL-SCNC: 4.1 MMOL/L — SIGNIFICANT CHANGE UP (ref 3.5–5.3)
PROT SERPL-MCNC: 7.3 G/DL — SIGNIFICANT CHANGE UP (ref 6–8.3)
RBC # BLD: 4.31 M/UL — SIGNIFICANT CHANGE UP (ref 3.8–5.2)
RBC # FLD: 14.5 % — SIGNIFICANT CHANGE UP (ref 10.3–14.5)
SODIUM SERPL-SCNC: 138 MMOL/L — SIGNIFICANT CHANGE UP (ref 135–145)
TROPONIN T, HIGH SENSITIVITY: < 6 NG/L — SIGNIFICANT CHANGE UP (ref ?–14)
TSH SERPL-MCNC: 1.43 UIU/ML — SIGNIFICANT CHANGE UP (ref 0.27–4.2)
WBC # BLD: 9.14 K/UL — SIGNIFICANT CHANGE UP (ref 3.8–10.5)
WBC # FLD AUTO: 9.14 K/UL — SIGNIFICANT CHANGE UP (ref 3.8–10.5)

## 2019-01-10 PROCEDURE — 99284 EMERGENCY DEPT VISIT MOD MDM: CPT

## 2019-01-10 PROCEDURE — 71046 X-RAY EXAM CHEST 2 VIEWS: CPT | Mod: 26

## 2019-01-10 NOTE — ED PROVIDER NOTE - OBJECTIVE STATEMENT
52 yo F denies pmhx here for sensation of tachycardia. pt reports symptoms have been on and off for several months but worse over the past few days. reports lasts seconds where she feels her HR fast and then resolves on own. No assoicated with position or activity. No other complaints. Denies fhx of heart dz or sudden death. Denies recent travel/hormone use. Denies recent surgery. Denies fever chills cp sob weakness HA dizziness rash numbness tingling.

## 2019-01-10 NOTE — ED ADULT TRIAGE NOTE - CHIEF COMPLAINT QUOTE
Pt states that she has been feeling her heart beating fast, chest pain and headache for 1 month, worse last night.  Pt denies PMH, denies daily medications

## 2019-01-10 NOTE — ED PROVIDER NOTE - PROGRESS NOTE DETAILS
ORQUIDEA Bhatt: pt doing well, no tachycardia or palpitations while in ED. Labs and cxr reviewed, will fu with cards for halter and further w.u

## 2019-01-10 NOTE — ED PROVIDER NOTE - MEDICAL DECISION MAKING DETAILS
52 yo F here for tachycardia, HR normal in ER. asymptomatic currently. reports feeling sensation intermittently for months. more frequently over the past few days. PLAN: labs, cxr,

## 2019-01-10 NOTE — ED PROVIDER NOTE - NSFOLLOWUPINSTRUCTIONS_ED_ALL_ED_FT
Drink plenty of water  Limit caffeine and alcohol  Follow up with cardiology   Return to ER for any new or worsening symptoms

## 2019-01-14 ENCOUNTER — APPOINTMENT (OUTPATIENT)
Dept: CARDIOLOGY | Facility: CLINIC | Age: 54
End: 2019-01-14

## 2019-01-17 ENCOUNTER — APPOINTMENT (OUTPATIENT)
Dept: INTERNAL MEDICINE | Facility: CLINIC | Age: 54
End: 2019-01-17

## 2019-01-29 ENCOUNTER — APPOINTMENT (OUTPATIENT)
Dept: DERMATOLOGY | Facility: CLINIC | Age: 54
End: 2019-01-29

## 2019-04-09 ENCOUNTER — APPOINTMENT (OUTPATIENT)
Dept: INTERNAL MEDICINE | Facility: CLINIC | Age: 54
End: 2019-04-09
Payer: OTHER GOVERNMENT

## 2019-04-09 ENCOUNTER — MED ADMIN CHARGE (OUTPATIENT)
Age: 54
End: 2019-04-09

## 2019-04-09 VITALS
HEIGHT: 63 IN | BODY MASS INDEX: 29.41 KG/M2 | OXYGEN SATURATION: 96 % | HEART RATE: 76 BPM | SYSTOLIC BLOOD PRESSURE: 102 MMHG | DIASTOLIC BLOOD PRESSURE: 70 MMHG | WEIGHT: 166 LBS

## 2019-04-09 DIAGNOSIS — R73.9 HYPERGLYCEMIA, UNSPECIFIED: ICD-10-CM

## 2019-04-09 DIAGNOSIS — Z00.00 ENCOUNTER FOR GENERAL ADULT MEDICAL EXAMINATION W/OUT ABNORMAL FINDINGS: ICD-10-CM

## 2019-04-09 DIAGNOSIS — I83.93 ASYMPTOMATIC VARICOSE VEINS OF BILATERAL LOWER EXTREMITIES: ICD-10-CM

## 2019-04-09 DIAGNOSIS — F32.9 MAJOR DEPRESSIVE DISORDER, SINGLE EPISODE, UNSPECIFIED: ICD-10-CM

## 2019-04-09 DIAGNOSIS — D18.00 HEMANGIOMA UNSPECIFIED SITE: ICD-10-CM

## 2019-04-09 DIAGNOSIS — E78.00 PURE HYPERCHOLESTEROLEMIA, UNSPECIFIED: ICD-10-CM

## 2019-04-09 PROCEDURE — 99406 BEHAV CHNG SMOKING 3-10 MIN: CPT | Mod: 25

## 2019-04-09 PROCEDURE — 90715 TDAP VACCINE 7 YRS/> IM: CPT

## 2019-04-09 PROCEDURE — 90732 PPSV23 VACC 2 YRS+ SUBQ/IM: CPT

## 2019-04-09 PROCEDURE — G0009: CPT

## 2019-04-09 PROCEDURE — 90472 IMMUNIZATION ADMIN EACH ADD: CPT

## 2019-04-09 PROCEDURE — 99386 PREV VISIT NEW AGE 40-64: CPT | Mod: 25

## 2019-04-09 RX ORDER — ERGOCALCIFEROL 1.25 MG/1
1.25 MG CAPSULE, LIQUID FILLED ORAL
Qty: 4 | Refills: 0 | Status: COMPLETED | COMMUNITY
Start: 2017-11-14 | End: 2019-04-09

## 2019-04-09 RX ORDER — OXYCODONE AND ACETAMINOPHEN 5; 325 MG/1; MG/1
5-325 TABLET ORAL
Qty: 20 | Refills: 0 | Status: COMPLETED | COMMUNITY
Start: 2017-05-26 | End: 2019-04-09

## 2019-04-09 RX ORDER — IBUPROFEN 800 MG/1
TABLET, FILM COATED ORAL
Refills: 0 | Status: COMPLETED | COMMUNITY
End: 2019-04-09

## 2019-04-09 NOTE — REVIEW OF SYSTEMS
[Fever] : no fever [Lower Ext Edema] : lower extremity edema [Chills] : no chills [Chest Pain] : no chest pain [Shortness Of Breath] : no shortness of breath

## 2019-04-09 NOTE — HEALTH RISK ASSESSMENT
[11-15] : 11-15 [Employed] : employed [2] : 1) Little interest or pleasure doing things for more than half of the days (2) [3] : 2) Feeling down, depressed, or hopeless for nearly every day (3) [Patient reported PAP Smear was normal] : Patient reported PAP Smear was normal [Patient reported mammogram was normal] : Patient reported mammogram was normal [Alone] : lives alone [] :  [Fully functional (bathing, dressing, toileting, transferring, walking, feeding)] : Fully functional (bathing, dressing, toileting, transferring, walking, feeding) [Fully functional (using the telephone, shopping, preparing meals, housekeeping, doing laundry, using] : Fully functional and needs no help or supervision to perform IADLs (using the telephone, shopping, preparing meals, housekeeping, doing laundry, using transportation, managing medications and managing finances) [] : No [de-identified] : Dr. Mann (endocrinology - last saw 1 year ago) [de-identified] : smokes 1/2 ppd x 30 years, thinking about quitting [de-identified] : socially [de-identified] : does not do regular exercise [FreeTextEntry1] : would like to meet with our behavioral health team, does not want to see psychiatry right now, no SI/HI [[id number]] : 5 [[id number]] : 14 [Sexually Active] : not sexually active [MammogramDate] : 11/2018 [PapSmearDate] : 2/2016 [ColonoscopyComments] : does not remember results [ColonoscopyDate] : 2013 [HIVComments] : negative [HepatitisCComments] : negative [HepatitisCDate] : 2/2016 [HIVDate] : 2/2016 [de-identified] : with cat [FreeTextEntry2] : Works in [employer] right now, starting work in a factory as well

## 2019-04-09 NOTE — ASSESSMENT
[FreeTextEntry1] : 53yoF presents to establish care.\par \par 1. HCM:\par -Lipid panel, A1c, BMP, vitamin D (had CBC/CMP/TSH WNL 1/2019)\par -HIV and HCV negative 2/2016\par -Pap smear with HPV negative 2/2016 - needs repeat 2021\par -Mammogram BIRADS2 11/2018\par -Colonoscopy 2013 but patient does not have records and doesn't remember results - will refer to GI for screening colonoscopy evaluation and to review imaging of hemangioma once it is received\par -Tdap and Pneumovax (active smoker) today\par \par 2. LE edema\par -Non-pitting on exam, L>R around ankle (where patient had fracture in recent past) - also with varicosities in BL LEs (had surgery years ago for this)\par -Will check electrolytes today\par -Patient recommended to elevate legs at night and wear compression stockings\par -Vascular surgery referral provided\par \par 3. Depression\par -Patient with PHQ9 of 14 today - denies current SI/HI\par -She would like to try therapy prior to medications or meeting with psychiatry - she is interested in our behavioral health team at the office - will give referral\par \par 4. Liver hemangioma\par -Patient states she has had imaging of this in the past - will get records - GI referral\par \par 5. Elevated cholesterol, elevated sugar in past, elevated Ca\par -Patient not currently on any medications for these\par -All calcium levels in HIE are WNL\par -Checking lipids, BMP, and A1c today\par \par 6. Smoker\par -Patient using patch at home and has desire to quit\par -Recommended continued use of patch, PPSV23 today

## 2019-04-09 NOTE — HISTORY OF PRESENT ILLNESS
[de-identified] : 53yoF presents for new patient evaluation. Patient was following with another primary care provider, Dr. Whalen in the past. \par \par Today she is complaining of tingling in her hands and feet that happens at times; she notices it mainly when sitting watching TV. She also has cramps in her legs frequently - she stands for long periods of time and is not drinking a lot of water to avoid going to the bathroom at work. She also c/o LE swelling that worsens during the day. She used compression stockings in the past but not recently and does not elevate legs at home. She also has varicose veins - had surgery years ago on left leg for this issue.\par \par She was seeing an endocrinologist in the past; she last saw him one year ago. She states that she has been told of elevated calcium in the past - she also had very low vitamin D and was taking 50,000 units weekly a while ago (not currently). She was also on metformin in the past but doesn't remember what her A1c was or if she was told that she had diabetes.\par \par Liver hemangioma: Previous primary provider Dr. Valerie Whalen [phone] was following this; patient will have records forwarded to our office.\par \par Patient had endoscopy/colonoscopy around 2013 - she was found to be H. pylori positive at that time and treated with antibiotics. Patient does not remember what colonoscopy results were - she never followed up. \par \par Takes Quetiapine or medication from [place] called Zopliclona for sleep sometimes.

## 2019-04-09 NOTE — PHYSICAL EXAM
[No Acute Distress] : no acute distress [Well Nourished] : well nourished [Well Developed] : well developed [Well-Appearing] : well-appearing [Normal Sclera/Conjunctiva] : normal sclera/conjunctiva [PERRL] : pupils equal round and reactive to light [Normal Outer Ear/Nose] : the outer ears and nose were normal in appearance [EOMI] : extraocular movements intact [Supple] : supple [Normal Oropharynx] : the oropharynx was normal [No Lymphadenopathy] : no lymphadenopathy [No Accessory Muscle Use] : no accessory muscle use [Clear to Auscultation] : lungs were clear to auscultation bilaterally [No Respiratory Distress] : no respiratory distress  [Regular Rhythm] : with a regular rhythm [Normal Rate] : normal rate  [Normal S1, S2] : normal S1 and S2 [Pedal Pulses Present] : the pedal pulses are present [No Masses] : no palpable masses [Normal Appearance] : normal in appearance [Non Tender] : non-tender [Soft] : abdomen soft [No Nipple Discharge] : no nipple discharge [No Axillary Lymphadenopathy] : no axillary lymphadenopathy [Non-distended] : non-distended [Normal Bowel Sounds] : normal bowel sounds [No Spinal Tenderness] : no spinal tenderness [Grossly Normal Strength/Tone] : grossly normal strength/tone [No Rash] : no rash [No Focal Deficits] : no focal deficits [Coordination Grossly Intact] : coordination grossly intact [Normal Gait] : normal gait [Normal Affect] : the affect was normal [Normal Insight/Judgement] : insight and judgment were intact [de-identified] : trace LE edema, non-pitting - mutliple varicosities of BL LEs L>R especially around ankle, non-painful

## 2019-04-09 NOTE — COUNSELING
[Behavioral health counseling provided] : behavioral health  [Discussed Cessation Strategies] : cessation strategies were discussed [Discussed Risks and Advised to Quit Smoking] : Discussed risks and advised to quit smoking [Tobacco Use Cessation Intermediate Greater Than 3 Minutes Up to 10 Minutes] : Tobacco Use Cessation Intermediate Greater Than 3 Minutes Up to 10 Minutes [ - Annual Depression Screening] : Annual Depression Screening

## 2019-04-10 LAB
25(OH)D3 SERPL-MCNC: 20.6 NG/ML
ANION GAP SERPL CALC-SCNC: 11 MMOL/L
BUN SERPL-MCNC: 14 MG/DL
CALCIUM SERPL-MCNC: 10.2 MG/DL
CHLORIDE SERPL-SCNC: 105 MMOL/L
CHOLEST SERPL-MCNC: 231 MG/DL
CHOLEST/HDLC SERPL: 3.4 RATIO
CO2 SERPL-SCNC: 24 MMOL/L
CREAT SERPL-MCNC: 0.56 MG/DL
ESTIMATED AVERAGE GLUCOSE: 111 MG/DL
GLUCOSE SERPL-MCNC: 104 MG/DL
HBA1C MFR BLD HPLC: 5.5 %
HDLC SERPL-MCNC: 69 MG/DL
LDLC SERPL CALC-MCNC: 148 MG/DL
POTASSIUM SERPL-SCNC: 4.6 MMOL/L
SODIUM SERPL-SCNC: 140 MMOL/L
TRIGL SERPL-MCNC: 68 MG/DL

## 2019-06-21 ENCOUNTER — APPOINTMENT (OUTPATIENT)
Dept: GASTROENTEROLOGY | Facility: CLINIC | Age: 54
End: 2019-06-21
Payer: OTHER GOVERNMENT

## 2019-06-21 VITALS
DIASTOLIC BLOOD PRESSURE: 70 MMHG | SYSTOLIC BLOOD PRESSURE: 90 MMHG | BODY MASS INDEX: 29.59 KG/M2 | TEMPERATURE: 98.2 F | WEIGHT: 167 LBS | OXYGEN SATURATION: 99 % | HEART RATE: 78 BPM | HEIGHT: 63 IN

## 2019-06-21 DIAGNOSIS — E21.5 DISORDER OF PARATHYROID GLAND, UNSPECIFIED: ICD-10-CM

## 2019-06-21 DIAGNOSIS — Z12.11 ENCOUNTER FOR SCREENING FOR MALIGNANT NEOPLASM OF COLON: ICD-10-CM

## 2019-06-21 DIAGNOSIS — K31.9 DISEASE OF STOMACH AND DUODENUM, UNSPECIFIED: ICD-10-CM

## 2019-06-21 DIAGNOSIS — D18.03 HEMANGIOMA OF INTRA-ABDOMINAL STRUCTURES: ICD-10-CM

## 2019-06-21 DIAGNOSIS — K21.9 GASTRO-ESOPHAGEAL REFLUX DISEASE W/OUT ESOPHAGITIS: ICD-10-CM

## 2019-06-21 PROCEDURE — 99204 OFFICE O/P NEW MOD 45 MIN: CPT

## 2019-06-21 RX ORDER — SODIUM SULFATE, POTASSIUM SULFATE, MAGNESIUM SULFATE 17.5; 3.13; 1.6 G/ML; G/ML; G/ML
17.5-3.13-1.6 SOLUTION, CONCENTRATE ORAL
Qty: 1 | Refills: 0 | Status: ACTIVE | COMMUNITY
Start: 2019-06-21 | End: 1900-01-01

## 2019-06-21 RX ORDER — CLINDAMYCIN PHOSPHATE 10 MG/ML
1 LOTION TOPICAL TWICE DAILY
Qty: 1 | Refills: 6 | Status: DISCONTINUED | COMMUNITY
Start: 2018-02-02 | End: 2019-06-21

## 2019-06-21 NOTE — PHYSICAL EXAM
[General Appearance - Alert] : alert [General Appearance - In No Acute Distress] : in no acute distress [PERRL With Normal Accommodation] : pupils were equal in size, round, and reactive to light [Sclera] : the sclera and conjunctiva were normal [Extraocular Movements] : extraocular movements were intact [Outer Ear] : the ears and nose were normal in appearance [Oropharynx] : the oropharynx was normal [Neck Cervical Mass (___cm)] : no neck mass was observed [Neck Appearance] : the appearance of the neck was normal [Thyroid Diffuse Enlargement] : the thyroid was not enlarged [Jugular Venous Distention Increased] : there was no jugular-venous distention [Thyroid Nodule] : there were no palpable thyroid nodules [Auscultation Breath Sounds / Voice Sounds] : lungs were clear to auscultation bilaterally [Heart Rate And Rhythm] : heart rate was normal and rhythm regular [Heart Sounds] : normal S1 and S2 [Heart Sounds Gallop] : no gallops [Murmurs] : no murmurs [Heart Sounds Pericardial Friction Rub] : no pericardial rub [Bowel Sounds] : normal bowel sounds [Abdomen Soft] : soft [Abdomen Tenderness] : non-tender [] : no hepato-splenomegaly [Abdomen Mass (___ Cm)] : no abdominal mass palpated [Cervical Lymph Nodes Enlarged Posterior Bilaterally] : posterior cervical [Cervical Lymph Nodes Enlarged Anterior Bilaterally] : anterior cervical [Supraclavicular Lymph Nodes Enlarged Bilaterally] : supraclavicular [No CVA Tenderness] : no ~M costovertebral angle tenderness [No Spinal Tenderness] : no spinal tenderness [Nail Clubbing] : no clubbing  or cyanosis of the fingernails [Abnormal Walk] : normal gait [Musculoskeletal - Swelling] : no joint swelling seen [Motor Tone] : muscle strength and tone were normal [Deep Tendon Reflexes (DTR)] : deep tendon reflexes were 2+ and symmetric [Sensation] : the sensory exam was normal to light touch and pinprick [No Focal Deficits] : no focal deficits [Oriented To Time, Place, And Person] : oriented to person, place, and time [Impaired Insight] : insight and judgment were intact [Affect] : the affect was normal

## 2019-06-21 NOTE — CONSULT LETTER
[Dear  ___] : Dear  [unfilled], [Consult Letter:] : I had the pleasure of evaluating your patient, [unfilled]. [Please see my note below.] : Please see my note below. [Consult Closing:] : Thank you very much for allowing me to participate in the care of this patient.  If you have any questions, please do not hesitate to contact me. [Sincerely,] : Sincerely, [FreeTextEntry3] : Ruddy Canas MD, FACG\par

## 2019-06-21 NOTE — HISTORY OF PRESENT ILLNESS
[Vomiting] : denies vomiting [Nausea] : denies nausea [Yellow Skin Or Eyes (Jaundice)] : denies jaundice [Diarrhea] : denies diarrhea [Constipation] : denies constipation [Abdominal Swelling] : denies abdominal swelling [Rectal Pain] : denies rectal pain [Heartburn] : heartburn [GERD] : gastroesophageal reflux disease [Abdominal Pain] : abdominal pain [Kidney Stone] : kidney stone [Wt Gain ___ Lbs] : no recent weight gain [Wt Loss ___ Lbs] : no recent weight loss [Hiatus Hernia] : no hiatus hernia [Peptic Ulcer Disease] : no peptic ulcer disease [Pancreatitis] : no pancreatitis [Cholelithiasis] : no cholelithiasis [Inflammatory Bowel Disease] : no inflammatory bowel disease [Irritable Bowel Syndrome] : no irritable bowel syndrome [Diverticulitis] : no diverticulitis [Alcohol Abuse] : no alcohol abuse [Malignancy] : no malignancy [Abdominal Surgery] : no abdominal surgery [Appendectomy] : no appendectomy [Cholecystectomy] : no cholecystectomy [de-identified] : 53 year old woman presents for a screening colonoscopy. She also complains of abdominal pain , bloating and heartburn. She was treated for. pylori several years ago. She denies rectal bleeding, melena or hematemesis. she was told of a liver hemangioma several years ago on sonogram and MRI but did not follow-up.

## 2019-06-28 ENCOUNTER — APPOINTMENT (OUTPATIENT)
Dept: VASCULAR SURGERY | Facility: CLINIC | Age: 54
End: 2019-06-28
Payer: OTHER GOVERNMENT

## 2019-06-28 VITALS
TEMPERATURE: 98 F | SYSTOLIC BLOOD PRESSURE: 107 MMHG | WEIGHT: 166 LBS | HEART RATE: 80 BPM | BODY MASS INDEX: 29.41 KG/M2 | DIASTOLIC BLOOD PRESSURE: 79 MMHG | HEIGHT: 63 IN

## 2019-06-28 VITALS — SYSTOLIC BLOOD PRESSURE: 113 MMHG | HEART RATE: 75 BPM | DIASTOLIC BLOOD PRESSURE: 80 MMHG

## 2019-06-28 PROCEDURE — 99203 OFFICE O/P NEW LOW 30 MIN: CPT

## 2019-06-28 PROCEDURE — 93970 EXTREMITY STUDY: CPT

## 2019-07-29 ENCOUNTER — FORM ENCOUNTER (OUTPATIENT)
Age: 54
End: 2019-07-29

## 2019-07-30 ENCOUNTER — APPOINTMENT (OUTPATIENT)
Dept: ULTRASOUND IMAGING | Facility: CLINIC | Age: 54
End: 2019-07-30
Payer: OTHER GOVERNMENT

## 2019-07-30 ENCOUNTER — OUTPATIENT (OUTPATIENT)
Dept: OUTPATIENT SERVICES | Facility: HOSPITAL | Age: 54
LOS: 1 days | End: 2019-07-30
Payer: OTHER GOVERNMENT

## 2019-07-30 DIAGNOSIS — Z00.8 ENCOUNTER FOR OTHER GENERAL EXAMINATION: ICD-10-CM

## 2019-07-30 PROCEDURE — 76700 US EXAM ABDOM COMPLETE: CPT | Mod: 26

## 2019-07-30 PROCEDURE — 76856 US EXAM PELVIC COMPLETE: CPT | Mod: 26

## 2019-07-30 PROCEDURE — 76856 US EXAM PELVIC COMPLETE: CPT

## 2019-07-30 PROCEDURE — 76700 US EXAM ABDOM COMPLETE: CPT

## 2019-10-17 ENCOUNTER — APPOINTMENT (OUTPATIENT)
Dept: INTERNAL MEDICINE | Facility: CLINIC | Age: 54
End: 2019-10-17

## 2019-10-22 ENCOUNTER — APPOINTMENT (OUTPATIENT)
Dept: DERMATOLOGY | Facility: CLINIC | Age: 54
End: 2019-10-22
Payer: OTHER GOVERNMENT

## 2019-10-22 DIAGNOSIS — C44.121 SQUAMOUS CELL CARCINOMA OF SKIN OF UNSPECIFIED EYELID, INCLUDING CANTHUS: ICD-10-CM

## 2019-10-22 DIAGNOSIS — L73.9 FOLLICULAR DISORDER, UNSPECIFIED: ICD-10-CM

## 2019-10-22 DIAGNOSIS — D22.9 MELANOCYTIC NEVI, UNSPECIFIED: ICD-10-CM

## 2019-10-22 DIAGNOSIS — Z12.83 ENCOUNTER FOR SCREENING FOR MALIGNANT NEOPLASM OF SKIN: ICD-10-CM

## 2019-10-22 PROCEDURE — 99214 OFFICE O/P EST MOD 30 MIN: CPT

## 2019-10-22 RX ORDER — BENZOYL PEROXIDE 5 G/100G
5 LIQUID TOPICAL DAILY
Qty: 1 | Refills: 6 | Status: ACTIVE | COMMUNITY
Start: 2019-10-22 | End: 1900-01-01

## 2019-10-22 RX ORDER — CLINDAMYCIN PHOSPHATE 10 MG/ML
1 LOTION TOPICAL TWICE DAILY
Qty: 1 | Refills: 3 | Status: ACTIVE | COMMUNITY
Start: 2019-10-22 | End: 1900-01-01

## 2019-10-29 ENCOUNTER — APPOINTMENT (OUTPATIENT)
Dept: INTERNAL MEDICINE | Facility: CLINIC | Age: 54
End: 2019-10-29

## 2019-11-06 ENCOUNTER — APPOINTMENT (OUTPATIENT)
Dept: GASTROENTEROLOGY | Facility: HOSPITAL | Age: 54
End: 2019-11-06

## 2019-11-13 ENCOUNTER — APPOINTMENT (OUTPATIENT)
Dept: GASTROENTEROLOGY | Facility: HOSPITAL | Age: 54
End: 2019-11-13

## 2020-01-10 ENCOUNTER — APPOINTMENT (OUTPATIENT)
Dept: VASCULAR SURGERY | Facility: CLINIC | Age: 55
End: 2020-01-10

## 2020-09-22 NOTE — DISCHARGE NOTE ADULT - NS AS DC DISCHARGE ACCOMPANY
Interesting case. This is a patient that I initially saw last year for what appeared to be ischemic colitis. She had multiple ulcers on initial colonoscopy so therefore had her repeat after 6 months time. Repeat colonoscopy however continued to show ulcers, biopsies continue to show acute inflammation. She currently has some flaring of symptoms once every few months which usually only lasts a few hours. She otherwise feels great and is not experiencing symptoms daily. Not sure what to make of it. Could it be early IBD? Or could she still be healing from her initial ischemic colitis?  Is monitoring enough for now? Or should I check an MRE and fecal calpro? Wanted to know your thoughts?     Cassy  Family

## 2024-08-15 NOTE — ED ADULT TRIAGE NOTE - SOURCE OF INFORMATION
Patient Pediatric Neurology  Pediatric Neurology  [address] [facility] [address] [place] [address]  Phone: [phone]  Fax: [phone]  Follow Up Time: 7-10 Days

## 2024-08-20 NOTE — DISCHARGE NOTE ADULT - HOSPITAL COURSE
RUTHIE MILLER    Patient Age: 77 year old   Interpreting service used: No    Insurance on file confirmed with caller: Yes    Patient seen within 1 year by a provider in primary care? Yes-      Refill to be: ePrescribed    Medication requested to be refilled: See Pended Medication    Addition Information: PATIENT IS OUT OF MEDICATION.    Does patient have enough medication for 72 business hours? No- Route message to provider clinical pool- HIGH PRIORITY    Caller informed to check with the pharmacy later for their refill. If problems arise, we will contact patient.    Message read back to caller for accuracy: Yes     WEIGHT AND HEIGHT:   Wt Readings from Last 1 Encounters:   02/01/24 85.4 kg (188 lb 3.2 oz)     Ht Readings from Last 1 Encounters:   02/01/24 5' 4\" (1.626 m)     BMI Readings from Last 1 Encounters:   02/01/24 32.30 kg/m²       ALLERGIES:  Nickel, Indomethacin, Latex, Naproxen, and Propoxyphene  Current Outpatient Medications   Medication Sig Dispense Refill    buPROPion XL (WELLBUTRIN XL) 300 MG 24 hr tablet Take 1 tablet by mouth daily. 90 tablet 0    fluoxetine (PROzac) 40 MG capsule Take 1 capsule by mouth daily. 90 capsule 0    amphetamine-dextroamphetamine (ADDERALL XR) 20 MG 24 hr capsule Take 1 capsule by mouth every morning. 30 capsule 0    atorvastatin (LIPITOR) 20 MG tablet Take 1 tablet by mouth daily. 90 tablet 0    albuterol 108 (90 Base) MCG/ACT inhaler Inhale 2 puffs into the lungs every 4 hours as needed for Wheezing. 1 each 1    lisinopril (ZESTRIL) 20 MG tablet Take 1 tablet by mouth daily. 90 tablet 1    albuterol (Ventolin HFA) 108 (90 Base) MCG/ACT inhaler Inhale 2 puffs into the lungs every 4 hours as needed for Shortness of Breath or Wheezing. 1 each 0    BIOTIN FORTE PO       famotidine (PEPCID) 10 MG tablet Take 10 mg by mouth in the morning and 10 mg in the evening.      Multiple Vitamins-Minerals (WOMENS MULTIVITAMIN PO)       Cholecalciferol (Vitamin D) 125 MCG (5000  UT) Cap       ferrous sulfate 325 (65 FE) MG EC tablet Take 325 mg by mouth 2 times daily.      Loratadine 10 MG Cap Take 10 mg by mouth daily as needed.      albuterol (VENTOLIN) (2.5 MG/3ML) 0.083% nebulizer solution Take 3 mLs by nebulization every 6 hours as needed for Wheezing or Shortness of Breath. 375 mL 1     No current facility-administered medications for this visit.         CALL BACK INFO: Ok to leave response (including medical information) on answering machine        PCP: Andres Martin MD         INS: Payor: MEDICARE / Plan: PARTA AND B / Product Type: MEDICARE   PATIENT ADDRESS:  [address] [address]   51 y/o F p/w worsening R gluteal abscess, s/p OR Incision and drainage 53 y/o F p/w worsening R gluteal abscess, CT scan revealed A patchy amorphous soft tissue attenuation within the right buttock   subcutaneous tissues with adjacent fat stranding/inflammatory changes and overlying skin thickening. No discrete rim-enhancing fluid collection.  Patient went to the OR with Dr Fregoso and s/p Incision and drainage. POD 1 patient pain improved, she is voiding ambulating and tolerating regular diet. She is to be discharged with out patient follow up and with VNS. Patient deemed stable for discharge.

## 2024-11-25 NOTE — BRIEF OPERATIVE NOTE - CONDITION POST OP
Stable